# Patient Record
Sex: MALE | Race: WHITE | Employment: FULL TIME | URBAN - METROPOLITAN AREA
[De-identification: names, ages, dates, MRNs, and addresses within clinical notes are randomized per-mention and may not be internally consistent; named-entity substitution may affect disease eponyms.]

---

## 2017-01-30 ENCOUNTER — TELEPHONE (OUTPATIENT)
Dept: FAMILY MEDICINE | Facility: CLINIC | Age: 57
End: 2017-01-30

## 2017-01-30 DIAGNOSIS — Z13.6 CARDIOVASCULAR SCREENING; LDL GOAL LESS THAN 160: ICD-10-CM

## 2017-01-30 DIAGNOSIS — Z00.00 ROUTINE GENERAL MEDICAL EXAMINATION AT A HEALTH CARE FACILITY: ICD-10-CM

## 2017-01-30 DIAGNOSIS — Z12.11 COLON CANCER SCREENING: ICD-10-CM

## 2017-01-30 DIAGNOSIS — Z12.5 SCREENING FOR PROSTATE CANCER: ICD-10-CM

## 2017-01-30 DIAGNOSIS — Z11.59 NEED FOR HEPATITIS C SCREENING TEST: ICD-10-CM

## 2017-01-30 DIAGNOSIS — R03.0 ELEVATED BLOOD PRESSURE READING WITHOUT DIAGNOSIS OF HYPERTENSION: Primary | ICD-10-CM

## 2017-01-30 NOTE — TELEPHONE ENCOUNTER
Please put in lab orders for Pt as he his going to have a Physical.   Thank You   Lab appointment is 2/9/17

## 2017-02-07 ENCOUNTER — TELEPHONE (OUTPATIENT)
Dept: FAMILY MEDICINE | Facility: CLINIC | Age: 57
End: 2017-02-07

## 2017-02-07 DIAGNOSIS — H57.10 EYE PAIN, UNSPECIFIED LATERALITY: Primary | ICD-10-CM

## 2017-02-07 RX ORDER — HYDROCODONE BITARTRATE AND ACETAMINOPHEN 5; 325 MG/1; MG/1
1 TABLET ORAL EVERY 6 HOURS PRN
Qty: 60 TABLET | Refills: 0 | Status: SHIPPED | OUTPATIENT
Start: 2017-02-10 | End: 2017-03-13

## 2017-02-07 NOTE — TELEPHONE ENCOUNTER
Norco med refill request, also please enter the lab orders for his lab appt on 02/09/2017 for his physical.   Thank you.

## 2017-02-07 NOTE — TELEPHONE ENCOUNTER
Controlled Substance Refill Request for norco  Problem List Complete:  No     PROVIDER TO CONSIDER COMPLETION OF PROBLEM LIST AND OVERVIEW/CONTROLLED SUBSTANCE AGREEMENT    Last Written Prescription Date:  1/12/17  Last Fill Quantity: 60,   # refills: 0    Last Office Visit with [facility] primary care provider: 9/1/16    Future Office visit:   Next 5 appointments (look out 90 days)     Feb 16, 2017  8:20 AM   [facility]    [address] [facility] [address] [phone]                  Controlled substance agreement on file: No.     Processing:  Patient will  in clinic   checked in past 6 months?  Yes 2/7/17   Jodie Henao RN

## 2017-02-09 DIAGNOSIS — R03.0 ELEVATED BLOOD PRESSURE READING WITHOUT DIAGNOSIS OF HYPERTENSION: ICD-10-CM

## 2017-02-09 DIAGNOSIS — Z13.6 CARDIOVASCULAR SCREENING; LDL GOAL LESS THAN 160: ICD-10-CM

## 2017-02-09 DIAGNOSIS — Z00.00 ROUTINE GENERAL MEDICAL EXAMINATION AT A HEALTH CARE FACILITY: ICD-10-CM

## 2017-02-09 DIAGNOSIS — Z12.5 SCREENING FOR PROSTATE CANCER: ICD-10-CM

## 2017-02-09 DIAGNOSIS — Z11.59 NEED FOR HEPATITIS C SCREENING TEST: ICD-10-CM

## 2017-02-09 LAB
ALBUMIN SERPL-MCNC: 4.1 G/DL (ref 3.4–5)
ALBUMIN UR-MCNC: NEGATIVE MG/DL
ALP SERPL-CCNC: 45 U/L (ref 40–150)
ALT SERPL W P-5'-P-CCNC: 37 U/L (ref 0–70)
ANION GAP SERPL CALCULATED.3IONS-SCNC: 8 MMOL/L (ref 3–14)
APPEARANCE UR: CLEAR
AST SERPL W P-5'-P-CCNC: 32 U/L (ref 0–45)
BILIRUB SERPL-MCNC: 0.5 MG/DL (ref 0.2–1.3)
BILIRUB UR QL STRIP: NEGATIVE
BUN SERPL-MCNC: 18 MG/DL (ref 7–30)
CALCIUM SERPL-MCNC: 8.7 MG/DL (ref 8.5–10.1)
CHLORIDE SERPL-SCNC: 109 MMOL/L (ref 94–109)
CHOLEST SERPL-MCNC: 173 MG/DL
CO2 SERPL-SCNC: 26 MMOL/L (ref 20–32)
COLOR UR AUTO: YELLOW
CREAT SERPL-MCNC: 0.9 MG/DL (ref 0.66–1.25)
ERYTHROCYTE [DISTWIDTH] IN BLOOD BY AUTOMATED COUNT: 12.5 % (ref 10–15)
GFR SERPL CREATININE-BSD FRML MDRD: 87 ML/MIN/1.7M2
GLUCOSE SERPL-MCNC: 101 MG/DL (ref 70–99)
GLUCOSE UR STRIP-MCNC: NEGATIVE MG/DL
HCT VFR BLD AUTO: 43.5 % (ref 40–53)
HCV AB SERPL QL IA: NORMAL
HDLC SERPL-MCNC: 43 MG/DL
HGB BLD-MCNC: 14.8 G/DL (ref 13.3–17.7)
HGB UR QL STRIP: ABNORMAL
KETONES UR STRIP-MCNC: NEGATIVE MG/DL
LDLC SERPL CALC-MCNC: 108 MG/DL
LEUKOCYTE ESTERASE UR QL STRIP: NEGATIVE
MCH RBC QN AUTO: 31.8 PG (ref 26.5–33)
MCHC RBC AUTO-ENTMCNC: 34 G/DL (ref 31.5–36.5)
MCV RBC AUTO: 93 FL (ref 78–100)
NITRATE UR QL: NEGATIVE
NONHDLC SERPL-MCNC: 130 MG/DL
PH UR STRIP: 5 PH (ref 5–7)
PLATELET # BLD AUTO: 278 10E9/L (ref 150–450)
POTASSIUM SERPL-SCNC: 4.2 MMOL/L (ref 3.4–5.3)
PROT SERPL-MCNC: 7.5 G/DL (ref 6.8–8.8)
PSA SERPL-ACNC: 1.99 UG/L (ref 0–4)
RBC # BLD AUTO: 4.66 10E12/L (ref 4.4–5.9)
RBC #/AREA URNS AUTO: NORMAL /HPF (ref 0–2)
SODIUM SERPL-SCNC: 143 MMOL/L (ref 133–144)
SP GR UR STRIP: >1.03 (ref 1–1.03)
TRIGL SERPL-MCNC: 108 MG/DL
URN SPEC COLLECT METH UR: ABNORMAL
UROBILINOGEN UR STRIP-ACNC: 0.2 EU/DL (ref 0.2–1)
WBC # BLD AUTO: 5.8 10E9/L (ref 4–11)
WBC #/AREA URNS AUTO: NORMAL /HPF (ref 0–2)

## 2017-02-09 PROCEDURE — 85027 COMPLETE CBC AUTOMATED: CPT | Performed by: FAMILY MEDICINE

## 2017-02-09 PROCEDURE — G0103 PSA SCREENING: HCPCS | Performed by: FAMILY MEDICINE

## 2017-02-09 PROCEDURE — 36415 COLL VENOUS BLD VENIPUNCTURE: CPT | Performed by: FAMILY MEDICINE

## 2017-02-09 PROCEDURE — 80061 LIPID PANEL: CPT | Performed by: FAMILY MEDICINE

## 2017-02-09 PROCEDURE — 81001 URINALYSIS AUTO W/SCOPE: CPT | Performed by: FAMILY MEDICINE

## 2017-02-09 PROCEDURE — 80053 COMPREHEN METABOLIC PANEL: CPT | Performed by: FAMILY MEDICINE

## 2017-02-09 PROCEDURE — 86803 HEPATITIS C AB TEST: CPT | Performed by: FAMILY MEDICINE

## 2017-02-09 NOTE — PROGRESS NOTES
SUBJECTIVE:     CC: Jayy Yo is an 56 year old male who presents for preventative health visit.     Healthy Habits:    Do you get at least three servings of calcium containing foods daily (dairy, green leafy vegetables, etc.)? yes    Amount of exercise or daily activities, outside of work: 7 day(s) per week    Problems taking medications regularly No    Medication side effects: No    Have you had an eye exam in the past two years? yes    Do you see a dentist twice per year? yes  Do you have sleep apnea, excessive snoring or daytime drowsiness?snore    Borderline blood pressure. Limited sodium. Exercise. No chest pain or shortness of breath.   Mom alive. Dad with cva - 79yo.   Siblings - some blood pressure.    PROBLEMS TO ADD ON...    Today's PHQ-2 Score:   PHQ-2 ( 1999 Pfizer) 1/13/2015 6/3/2014   Q1: Little interest or pleasure in doing things 0 0   Q2: Feeling down, depressed or hopeless 0 0   PHQ-2 Score 0 0       Abuse: Current or Past(Physical, Sexual or Emotional)- No  Do you feel safe in your environment - Yes    Social History   Substance Use Topics     Smoking status: Never Smoker      Smokeless tobacco: Never Used      Comment: Lives in smoke free household     Alcohol Use: 0.0 oz/week     0 Standard drinks or equivalent per week      Comment: social     beer or mixed 7 per week    Last PSA:   PSA   Date Value Ref Range Status   02/09/2017 1.99 0 - 4 ug/L Final     Comment:     Assay Method:  Chemiluminescence using Siemens Vista analyzer       Recent Labs   Lab Test  02/09/17   0803  01/18/16   0743  01/13/15   0842  12/02/13   0803   CHOL  173  186  178  196   HDL  43  41  45  41   LDL  108*  111*  106  129   TRIG  108  172*  136  129   CHOLHDLRATIO   --    --   4.0  4.8   NHDL  130*  145*   --    --        Reviewed orders with patient. Reviewed health maintenance and updated orders accordingly - Yes    All Histories reviewed and updated in Epic.      ROS:  C: NEGATIVE for fever, chills, change  in weight  I: NEGATIVE for worrisome rashes, moles or lesions  E: NEGATIVE for vision changes or irritation- ongoing bilateral eye issues.   ENT: NEGATIVE for ear, mouth and throat problems. Some snoring.   R: NEGATIVE for significant cough or SOB  CV: NEGATIVE for chest pain, palpitations or peripheral edema. Good exercise tolerance.   GI: NEGATIVE for nausea, abdominal pain, heartburn, or change in bowel habits   male: negative for dysuria, hematuria, decreased urinary stream, erectile dysfunction, urethral discharge  M: NEGATIVE for significant arthralgias or myalgia  N: NEGATIVE for weakness, dizziness or paresthesias  E: NEGATIVE for temperature intolerance, skin/hair changes  H: NEGATIVE for bleeding problems  P: NEGATIVE for changes in mood or affect    Problem list, Medication list, Allergies, and Medical/Social/Surgical histories reviewed in [facility] and updated as appropriate.  OBJECTIVE:     There were no vitals taken for this visit.   BP (!) 140/98  Pulse 59  Temp 97  F (36.1  C) (Oral)  Wt 238 lb (108 kg)  SpO2 95%  BMI 30.56 kg/m2   EXAM:  GENERAL: healthy, alert and no distress  HENT: ear canals and TM's normal, nose and mouth without ulcers or lesions  NECK: no adenopathy, no asymmetry, masses, or scars and thyroid normal to palpation  RESP: lungs clear to auscultation - no rales, rhonchi or wheezes  BREAST: normal without masses, tenderness or nipple discharge and no palpable axillary masses or adenopathy  CV: regular rate and rhythm, normal S1 S2, no S3 or S4, no murmur, click or rub, no peripheral edema and peripheral pulses strong  ABDOMEN: soft, nontender, no hepatosplenomegaly, no masses and bowel sounds normal   (male): patient deferred /rectal exams.   MS: no gross musculoskeletal defects noted, no edema  MS: tight lower lumbar muscles.   SKIN: no suspicious lesions or rashes  NEURO: Normal strength and tone, mentation intact and speech normal  BACK: no CVA tenderness, no paralumbar  "tenderness  PSYCH: mentation appears normal, affect normal/bright  LYMPH: no cervical, supraclavicular, axillary, or inguinal adenopathy    ASSESSMENT/PLAN:     ASSESSMENT / PLAN:  (Z00.00) Routine general medical examination at a health care facility  (primary encounter diagnosis)  Comment: generally healthy and normal exam. Patient deferred /rectal exam  Plan: Reviewed self mole/testicle check handout.  Exercise    (Z12.11) Colon cancer screening  Comment: overdue  Plan: at least mail in fit card - I prefer colonoscopy    (R03.0) Elevated blood-pressure reading without diagnosis of hypertension  Comment: patient will work on diet/exercise and sodium  Plan: self-monitor. Recheck in 6 months  Sooner if worse. norvasc if worse. Chest pain or shortness of breath to er. Lipids ok/non-smoker and no family history early cad.     (M62.995) Muscle spasm  Comment: needs help  Plan: tiZANidine (ZANAFLEX) 4 MG tablet        Continue pain meds per contract. Add zanaflex. Reveiwed risks and side effects of medication  Flexeril too sedating in past. Consider pain specialsit help. Expected course and warning signs reviewed. Call/email with questions/concerns        COUNSELING:  Reviewed preventive health counseling, as reflected in patient instructions       Consider AAA screening for ages 65-75 and smoking history       Regular exercise       Healthy diet/nutrition       Vision screening       Hearing screening       Colon cancer screening       Prostate cancer screening       Osteoporosis Prevention/Bone Health         reports that he has never smoked. He has never used smokeless tobacco.    Estimated body mass index is 30.80 kg/(m^2) as calculated from the following:    Height as of 1/25/16: 6' 2\" (1.88 m).    Weight as of 9/1/16: 240 lb (108.863 kg).       Counseling Resources:  ATP IV Guidelines  Pooled Cohorts Equation Calculator  FRAX Risk Assessment  ICSI Preventive Guidelines  Dietary Guidelines for Americans, " 2010  USDA's MyPlate  ASA Prophylaxis  Lung CA Screening    Vinay Moralez MD  [facility]

## 2017-02-16 ENCOUNTER — OFFICE VISIT (OUTPATIENT)
Dept: FAMILY MEDICINE | Facility: CLINIC | Age: 57
End: 2017-02-16
Payer: COMMERCIAL

## 2017-02-16 VITALS
BODY MASS INDEX: 30.56 KG/M2 | SYSTOLIC BLOOD PRESSURE: 140 MMHG | HEART RATE: 59 BPM | WEIGHT: 238 LBS | DIASTOLIC BLOOD PRESSURE: 98 MMHG | OXYGEN SATURATION: 95 % | TEMPERATURE: 97 F

## 2017-02-16 DIAGNOSIS — M62.838 MUSCLE SPASM: ICD-10-CM

## 2017-02-16 DIAGNOSIS — Z00.00 ROUTINE GENERAL MEDICAL EXAMINATION AT A HEALTH CARE FACILITY: Primary | ICD-10-CM

## 2017-02-16 DIAGNOSIS — R03.0 ELEVATED BLOOD-PRESSURE READING WITHOUT DIAGNOSIS OF HYPERTENSION: ICD-10-CM

## 2017-02-16 DIAGNOSIS — Z12.11 COLON CANCER SCREENING: ICD-10-CM

## 2017-02-16 PROCEDURE — 99396 PREV VISIT EST AGE 40-64: CPT | Performed by: FAMILY MEDICINE

## 2017-02-16 NOTE — MR AVS SNAPSHOT
After Visit Summary   2/16/2017    Jayy Yo    MRN: [id number]           Patient Information     Date Of Birth          1960        Visit Information        Provider Department      2/16/2017 8:20 AM Vinay Moralez MD [facility]        Today's Diagnoses     Routine general medical examination at a health care facility    -  1    Colon cancer screening        Elevated blood-pressure reading without diagnosis of hypertension        Muscle spasm          Care Instructions      Preventive Health Recommendations  Male Ages 50 - 64    Yearly exam:             See your health care provider every year in order to  o   Review health changes.   o   Discuss preventive care.    o   Review your medicines if your doctor has prescribed any.     Have a cholesterol test every 5 years, or more frequently if you are at risk for high cholesterol/heart disease.     Have a diabetes test (fasting glucose) every three years. If you are at risk for diabetes, you should have this test more often.     Have a colonoscopy at age 50, or have a yearly FIT test (stool test). These exams will check for colon cancer.      Talk with your health care provider about whether or not a prostate cancer screening test (PSA) is right for you.    You should be tested each year for STDs (sexually transmitted diseases), if you re at risk.     Shots: Get a flu shot each year. Get a tetanus shot every 10 years.     Nutrition:    Eat at least 5 servings of fruits and vegetables daily.     Eat whole-grain bread, whole-wheat pasta and brown rice instead of white grains and rice.     Talk to your provider about Calcium and Vitamin D.     Lifestyle    Exercise for at least 150 minutes a week (30 minutes a day, 5 days a week). This will help you control your weight and prevent disease.     Limit alcohol to one drink per day.     No smoking.     Wear sunscreen to prevent skin cancer.     See your dentist every six months for an  "exam and cleaning.     See your eye doctor every 1 to 2 years.            Follow-ups after your visit        Who to contact     If you have questions or need follow up information about today's clinic visit or your schedule please contact [facility] directly at [phone].  Normal or non-critical lab and imaging results will be communicated to you by MyChart, letter or phone within 4 business days after the clinic has received the results. If you do not hear from us within 7 days, please contact the clinic through MyChart or phone. If you have a critical or abnormal lab result, we will notify you by phone as soon as possible.  Submit refill requests through [employer] or call your pharmacy and they will forward the refill request to us. Please allow 3 business days for your refill to be completed.          Additional Information About Your Visit        MyChart Information     [employer] lets you send messages to your doctor, view your test results, renew your prescriptions, schedule appointments and more. To sign up, go to [email or web address] . Click on \"Log in\" on the left side of the screen, which will take you to the Welcome page. Then click on \"Sign up Now\" on the right side of the page.     You will be asked to enter the access code listed below, as well as some personal information. Please follow the directions to create your username and password.     Your access code is: [email or web address]  Expires: 2017  1:37 PM     Your access code will  in 90 days. If you need help or a new code, please call your [facility] or [phone].        Care EveryWhere ID     This is your Care EveryWhere ID. This could be used by other organizations to access your [place] medical records  [id number]        Your Vitals Were     Pulse Temperature Pulse Oximetry BMI (Body Mass Index)          59 97  F (36.1  C) (Oral) 95% 30.56 kg/m2         Blood Pressure from Last 3 Encounters:   17 (!) 140/98 "   09/01/16 130/80   01/25/16 120/80    Weight from Last 3 Encounters:   02/16/17 238 lb (108 kg)   09/01/16 240 lb (108.9 kg)   01/25/16 238 lb (108 kg)              Today, you had the following     No orders found for display         Today's Medication Changes          These changes are accurate as of: 2/16/17  1:37 PM.  If you have any questions, ask your nurse or doctor.               Start taking these medicines.        Dose/Directions    tiZANidine 4 MG tablet   Commonly known as:  ZANAFLEX   Used for:  Muscle spasm   Started by:  Vinay Moralez MD        Dose:  4 mg   Take 1 tablet (4 mg) by mouth 3 times daily as needed for muscle spasms (can take 1/2 pill if sedating.)   Quantity:  50 tablet   Refills:  1            Where to get your medicines      These medications were sent to [facility] [address]  [address] [address], [facility] [address]     Phone:  [phone]     tiZANidine 4 MG tablet                Primary Care Provider Office Phone # Fax #    Vinay Moralez [phone]       [facility] [address] [facility] [address]        Thank you!     Thank you for choosing [facility]  for your care. Our goal is always to provide you with excellent care. Hearing back from our patients is one way we can continue to improve our services. Please take a few minutes to complete the written survey that you may receive in the mail after your visit with us. Thank you!             Your Updated Medication List - Protect others around you: Learn how to safely use, store and throw away your medicines at www.disposemymeds.org.          This list is accurate as of: 2/16/17  1:37 PM.  Always use your most recent med list.                   Brand Name Dispense Instructions for use    HYDROcodone-acetaminophen 5-325 MG per tablet    NORCO    60 tablet    Take 1 tablet by mouth every 6 hours as needed for moderate to  severe pain (max#60/month)       ibuprofen 200 MG tablet   Generic drug:  ibuprofen      Take 600 mg by mouth every 4 hours as needed for mild pain       tiZANidine 4 MG tablet    ZANAFLEX    50 tablet    Take 1 tablet (4 mg) by mouth 3 times daily as needed for muscle spasms (can take 1/2 pill if sedating.)

## 2017-02-16 NOTE — NURSING NOTE
"Chief Complaint   Patient presents with     Physical       Initial BP (!) 140/98  Pulse 59  Temp 97  F (36.1  C) (Oral)  Wt 238 lb (108 kg)  SpO2 95%  BMI 30.56 kg/m2 Estimated body mass index is 30.56 kg/(m^2) as calculated from the following:    Height as of 1/25/16: 6' 2\" (1.88 m).    Weight as of this encounter: 238 lb (108 kg).  Medication Reconciliation: complete   Jodie Olson CMA    "

## 2017-02-28 DIAGNOSIS — Z12.11 COLON CANCER SCREENING: ICD-10-CM

## 2017-02-28 LAB — HEMOCCULT STL QL IA: NEGATIVE

## 2017-02-28 PROCEDURE — 82274 ASSAY TEST FOR BLOOD FECAL: CPT | Performed by: FAMILY MEDICINE

## 2017-03-09 ENCOUNTER — OFFICE VISIT (OUTPATIENT)
Dept: OPTOMETRY | Facility: CLINIC | Age: 57
End: 2017-03-09
Payer: COMMERCIAL

## 2017-03-09 DIAGNOSIS — Z96.1 PSEUDOPHAKIA: ICD-10-CM

## 2017-03-09 DIAGNOSIS — H26.492 POSTERIOR CAPSULAR OPACIFICATION VISUALLY SIGNIFICANT OF LEFT EYE: Primary | ICD-10-CM

## 2017-03-09 DIAGNOSIS — H52.12 MYOPIA OF LEFT EYE: ICD-10-CM

## 2017-03-09 DIAGNOSIS — H52.4 PRESBYOPIA: ICD-10-CM

## 2017-03-09 DIAGNOSIS — Z86.69 HX OF RETINAL DETACHMENT: ICD-10-CM

## 2017-03-09 DIAGNOSIS — H52.201 ASTIGMATISM OF RIGHT EYE: ICD-10-CM

## 2017-03-09 PROCEDURE — 92015 DETERMINE REFRACTIVE STATE: CPT | Performed by: OPTOMETRIST

## 2017-03-09 PROCEDURE — 92014 COMPRE OPH EXAM EST PT 1/>: CPT | Performed by: OPTOMETRIST

## 2017-03-09 ASSESSMENT — REFRACTION_MANIFEST
METHOD_AUTOREFRACTION: 1
OD_AXIS: 079
OS_ADD: +2.50
OD_ADD: +2.50
OS_AXIS: 175
OD_CYLINDER: +1.25
OD_CYLINDER: +1.75
OD_SPHERE: -1.25
OD_AXIS: 080
OS_CYLINDER: +0.25
OS_SPHERE: -1.00
OS_SPHERE: -1.00
OS_CYLINDER: SPHERE
OD_SPHERE: PLANO

## 2017-03-09 ASSESSMENT — VISUAL ACUITY
METHOD: SNELLEN - LINEAR
OD_SC+: -1
CORRECTION_TYPE: GLASSES
OD_CC: 20/25-1
OS_SC: 20/70+1
OS_CC: 20/30-1
OD_SC: 20/50
OD_SC: 20/25
OS_SC: 20/50
OS_PH_SC: 20/40

## 2017-03-09 ASSESSMENT — REFRACTION_WEARINGRX
SPECS_TYPE: OTC'S
OD_CYLINDER: SPHERE
OS_SPHERE: -0.25
SPECS_TYPE: SVL DISTANCE
OD_AXIS: 075
OD_SPHERE: PLANO
OS_CYLINDER: SPHERE
OS_CYLINDER: SPHERE
OD_CYLINDER: +1.25
OD_SPHERE: +1.50
OS_SPHERE: +1.50

## 2017-03-09 ASSESSMENT — TONOMETRY
OD_IOP_MMHG: 19
IOP_METHOD: APPLANATION
OS_IOP_MMHG: 19

## 2017-03-09 ASSESSMENT — SLIT LAMP EXAM - LIDS
COMMENTS: NORMAL
COMMENTS: NORMAL

## 2017-03-09 ASSESSMENT — PACHYMETRY
OS_CT(UM): 600
OD_CT(UM): 608

## 2017-03-09 ASSESSMENT — CUP TO DISC RATIO
OD_RATIO: 0.2
OS_RATIO: 0.2

## 2017-03-09 ASSESSMENT — KERATOMETRY
OD_K2POWER_DIOPTERS: 40.50
OD_AXISANGLE2_DEGREES: 167
OD_K1POWER_DIOPTERS: 38.25
OS_AXISANGLE2_DEGREES: 147
OS_K1POWER_DIOPTERS: 38.00
OS_K2POWER_DIOPTERS: 37.50

## 2017-03-09 ASSESSMENT — EXTERNAL EXAM - RIGHT EYE: OD_EXAM: NORMAL

## 2017-03-09 ASSESSMENT — EXTERNAL EXAM - LEFT EYE: OS_EXAM: NORMAL

## 2017-03-09 ASSESSMENT — CONF VISUAL FIELD: OD_NORMAL: 1

## 2017-03-09 NOTE — PATIENT INSTRUCTIONS
Patient was advised of today's exam findings.  Consider prescription reading glasses   Refer for YAG capsulotomy     See Porfirio Verduzco MD  [facility]  [address] [place] [address]  ([facility] 81) [phone]      Return in 1 year for eye exam    Yazmin Bolton O.D.  [address] [address] [address] [place], [place] [id number] [phone]

## 2017-03-09 NOTE — PROGRESS NOTES
Chief Complaint   Patient presents with     COMPREHENSIVE EYE EXAM         Last Eye Exam: 10/28/2014  Dilated Previously: Yes    What are you currently using to see?  Readers, Uses them to read print and to use the computer and on cell phone     Distance Vision Acuity: Noticed gradual change in both eyes, but his injured eye might be just a bit worse. Not as sharp as it used to be. Lighting is key, it's obviously better when the lighting is good.    Near Vision Acuity: Satisfied with vision while reading and using computer with +2.00 readers.  good lighting helps    Eye Comfort: Feels like he's in a fishbowl. His eyes seem to build up a mucus   Do you use eye drops? : No  Occupation or Hobbies: [employer]           Medical, surgical and family histories reviewed and updated 3/9/2017.     Frustrated that his change in vision makes him slower at work.  Has notice more trouble seeing to left side with left eye since retinal detachment     OBJECTIVE: See Ophthalmology exam    ASSESSMENT:    ICD-10-CM    1. 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 Pseudophakia bilateral Z96.1 EYE EXAM (SIMPLE-NONBILLABLE)     REFRACTIVE STATUS     OPHTHALMOLOGY ADULT REFERRAL   3. Astigmatism of right eye H52.201 EYE EXAM (SIMPLE-NONBILLABLE)     REFRACTIVE STATUS   4. Myopia of left eye H52.12 EYE EXAM (SIMPLE-NONBILLABLE)     REFRACTIVE STATUS   5. Presbyopia H52.4 EYE EXAM (SIMPLE-NONBILLABLE)     REFRACTIVE STATUS   6. Hx of retinal detachment right 2014, left 2016 Z86.69 EYE EXAM (SIMPLE-NONBILLABLE)     REFRACTIVE STATUS      PLAN:     Patient Instructions   Patient was advised of today's exam findings.  Consider prescription reading glasses   Refer for YAG capsulotomy     See Porfirio Verduzco MD  [facility]  [address] [place]  [address]  ([facility] 81) [phone]      Return in 1 year for eye exam    Yazmin Bolton O.D.  [address] [place], [place] [id number] [phone]

## 2017-03-09 NOTE — MR AVS SNAPSHOT
"              After Visit Summary   3/9/2017    Jayy Yo    MRN: [id number]           Patient Information     Date Of Birth          1960        Visit Information        Provider Department      3/9/2017 9:00 AM Yazmin Bolton OD [facility]        Today's Diagnoses     Posterior capsular opacification visually significant of left eye    -  1      Care Instructions    Patient was advised of today's exam findings.  Consider prescription reading glasses   Refer for YAG capsulotomy     See Porfirio Verduzco MD  [facility]  [address] [place] [address]  ([place] [address])  [phone]      Return in 1 year for eye exam    Yazmin Bolton O.D.  [facility]   [address] [place], [place] [id number] [phone]          Follow-ups after your visit        Who to contact     If you have questions or need follow up information about today's clinic visit or your schedule please contact [facility] directly at [phone].  Normal or non-critical lab and imaging results will be communicated to you by MyChart, letter or phone within 4 business days after the clinic has received the results. If you do not hear from us within 7 days, please contact the clinic through MyChart or phone. If you have a critical or abnormal lab result, we will notify you by phone as soon as possible.  Submit refill requests through [employer] or call your pharmacy and they will forward the refill request to us. Please allow 3 business days for your refill to be completed.          Additional Information About Your Visit        MyChart Information     [employer] lets you send messages to your doctor, view your test results, renew your prescriptions, schedule appointments and more. To sign up, go to [email or web address] . Click on \"Log in\" on the left side of the screen, which will take you to the Welcome page. Then click on \"Sign up Now\" on the " right side of the page.     You will be asked to enter the access code listed below, as well as some personal information. Please follow the directions to create your username and password.     Your access code is: [email or web address]  Expires: 2017  1:37 PM     Your access code will  in 90 days. If you need help or a new code, please call your [place] clinic or [phone].        Care EveryWhere ID     This is your Care EveryWhere ID. This could be used by other organizations to access your [place] medical records  [id number]         Blood Pressure from Last 3 Encounters:   17 (!) 140/98   16 130/80   16 120/80    Weight from Last 3 Encounters:   17 108 kg (238 lb)   16 108.9 kg (240 lb)   16 108 kg (238 lb)              Today, you had the following     No orders found for display       Primary Care Provider Office Phone # Fax #    Vinay Kendall Moralez [phone]       [facility] [address] [facility] [address]        Thank you!     Thank you for choosing [facility]  for your care. Our goal is always to provide you with excellent care. Hearing back from our patients is one way we can continue to improve our services. Please take a few minutes to complete the written survey that you may receive in the mail after your visit with us. Thank you!             Your Updated Medication List - Protect others around you: Learn how to safely use, store and throw away your medicines at www.disposemymeds.org.          This list is accurate as of: 3/9/17 10:31 AM.  Always use your most recent med list.                   Brand Name Dispense Instructions for use    HYDROcodone-acetaminophen 5-325 MG per tablet    NORCO    60 tablet    Take 1 tablet by mouth every 6 hours as needed for moderate to severe pain (max#60/month)       ibuprofen 200 MG tablet   Generic drug:  ibuprofen      Take 600 mg by mouth every 4 hours as needed for mild  pain       tiZANidine 4 MG tablet    ZANAFLEX    50 tablet    Take 1 tablet (4 mg) by mouth 3 times daily as needed for muscle spasms (can take 1/2 pill if sedating.)

## 2017-03-13 DIAGNOSIS — H57.10 EYE PAIN, UNSPECIFIED LATERALITY: ICD-10-CM

## 2017-03-13 RX ORDER — HYDROCODONE BITARTRATE AND ACETAMINOPHEN 5; 325 MG/1; MG/1
1 TABLET ORAL EVERY 6 HOURS PRN
Qty: 60 TABLET | Refills: 0 | Status: SHIPPED | OUTPATIENT
Start: 2017-03-13 | End: 2017-04-13

## 2017-03-13 NOTE — TELEPHONE ENCOUNTER
At [facility] last physical, discussed his shoulder pain. Talked about a muscle relaxer, he did not realize at the time that the pain med that he currently takes is different. He declined the muscle relaxer. He did not pick it up at the Pharmacy. He says he does not do well on muscle relaxers. He would like to continue his current regime with the pain medication. He is requesting a refill of Norco please.  Thank You

## 2017-03-13 NOTE — TELEPHONE ENCOUNTER
Controlled Substance Refill Request for Norco  Problem List Complete:  No     PROVIDER TO CONSIDER COMPLETION OF PROBLEM LIST AND OVERVIEW/CONTROLLED SUBSTANCE AGREEMENT    Last Written Prescription Date:  2/10/17  Last Fill Quantity: 60,   # refills: 0    Last Office Visit with [facility] primary care provider: 2/16/17    Controlled substance agreement on file: No.        checked in past 6 months?  Yes 3/13/17     Stefanie WILLIAMN, RN, CPN

## 2017-04-12 ENCOUNTER — TELEPHONE (OUTPATIENT)
Dept: FAMILY MEDICINE | Facility: CLINIC | Age: 57
End: 2017-04-12

## 2017-04-12 DIAGNOSIS — H57.10 EYE PAIN, UNSPECIFIED LATERALITY: ICD-10-CM

## 2017-04-12 NOTE — TELEPHONE ENCOUNTER
Controlled Substance Refill Request for Norco  Problem List Complete: No      PROVIDER TO CONSIDER COMPLETION OF PROBLEM LIST AND OVERVIEW/CONTROLLED SUBSTANCE AGREEMENT     Last Written Prescription Date: 3/13/17  Last Fill Quantity: 60, # refills: 0     Last Office Visit with [facility] primary care provider: 2/16/17     Controlled substance agreement on file: No.          checked in past 6 months? Yes 3/13/17      Jodie Henao RN

## 2017-04-13 RX ORDER — HYDROCODONE BITARTRATE AND ACETAMINOPHEN 5; 325 MG/1; MG/1
1 TABLET ORAL EVERY 6 HOURS PRN
Qty: 60 TABLET | Refills: 0 | Status: SHIPPED | OUTPATIENT
Start: 2017-05-12 | End: 2017-06-08

## 2017-04-13 RX ORDER — HYDROCODONE BITARTRATE AND ACETAMINOPHEN 5; 325 MG/1; MG/1
1 TABLET ORAL EVERY 6 HOURS PRN
Qty: 60 TABLET | Refills: 0 | Status: SHIPPED | OUTPATIENT
Start: 2017-04-13 | End: 2017-04-13

## 2017-06-07 ENCOUNTER — TELEPHONE (OUTPATIENT)
Dept: FAMILY MEDICINE | Facility: CLINIC | Age: 57
End: 2017-06-07

## 2017-06-07 DIAGNOSIS — H57.10 EYE PAIN, UNSPECIFIED LATERALITY: ICD-10-CM

## 2017-06-07 NOTE — TELEPHONE ENCOUNTER
Patient is requesting a refill of his pain medication Norco please. Please let abdi know its ready. Thank you!

## 2017-06-07 NOTE — TELEPHONE ENCOUNTER
Last refill 5/12/17  Last office visit 2/16/17     Controlled Substance Refill Request for Norco  Problem List Complete: No       PROVIDER TO CONSIDER COMPLETION OF PROBLEM LIST AND OVERVIEW/CONTROLLED SUBSTANCE AGREEMENT      Last Written Prescription Date: 3/13/17  Last Fill Quantity: 60, # refills: 0      Last Office Visit with [facility] primary care provider: 2/16/17      Controlled substance agreement on file: No.           checked in past 6 months? Yes 3/13/17

## 2017-06-08 RX ORDER — HYDROCODONE BITARTRATE AND ACETAMINOPHEN 5; 325 MG/1; MG/1
1 TABLET ORAL EVERY 6 HOURS PRN
Qty: 60 TABLET | Refills: 0 | Status: SHIPPED | OUTPATIENT
Start: 2017-06-10 | End: 2017-07-17

## 2017-06-08 NOTE — TELEPHONE ENCOUNTER
Brought RX to Starr, his wife, Informed her that an appointment is needed in the next month.Jolene Strong MA/TC

## 2017-06-08 NOTE — TELEPHONE ENCOUNTER
Please call. Patient not due until Saturday but can pick-up. I'd also like a follow-up md appointment in next month to discuss medication/pain/etc.

## 2017-06-26 ENCOUNTER — OFFICE VISIT (OUTPATIENT)
Dept: FAMILY MEDICINE | Facility: CLINIC | Age: 57
End: 2017-06-26
Payer: COMMERCIAL

## 2017-06-26 VITALS
DIASTOLIC BLOOD PRESSURE: 74 MMHG | OXYGEN SATURATION: 95 % | BODY MASS INDEX: 31.7 KG/M2 | TEMPERATURE: 97.4 F | HEART RATE: 54 BPM | SYSTOLIC BLOOD PRESSURE: 110 MMHG | WEIGHT: 247 LBS | HEIGHT: 74 IN

## 2017-06-26 DIAGNOSIS — R03.0 ELEVATED BLOOD-PRESSURE READING WITHOUT DIAGNOSIS OF HYPERTENSION: Primary | ICD-10-CM

## 2017-06-26 DIAGNOSIS — H57.10 EYE PAIN, UNSPECIFIED LATERALITY: ICD-10-CM

## 2017-06-26 PROCEDURE — 99214 OFFICE O/P EST MOD 30 MIN: CPT | Performed by: FAMILY MEDICINE

## 2017-06-26 NOTE — MR AVS SNAPSHOT
"              After Visit Summary   2017    Jayy Yo    MRN: [id number]           Patient Information     Date Of Birth          1960        Visit Information        Provider Department      2017 7:15 AM Vinay Moralez MD [facility]        Today's Diagnoses     Elevated blood-pressure reading without diagnosis of hypertension    -  1    Eye pain, unspecified laterality           Follow-ups after your visit        Who to contact     If you have questions or need follow up information about today's clinic visit or your schedule please contact [facility] directly at [phone].  Normal or non-critical lab and imaging results will be communicated to you by AYLIENhart, letter or phone within 4 business days after the clinic has received the results. If you do not hear from us within 7 days, please contact the clinic through [employer] or phone. If you have a critical or abnormal lab result, we will notify you by phone as soon as possible.  Submit refill requests through [employer] or call your pharmacy and they will forward the refill request to us. Please allow 3 business days for your refill to be completed.          Additional Information About Your Visit        MyChart Information     [employer] lets you send messages to your doctor, view your test results, renew your prescriptions, schedule appointments and more. To sign up, go to [email or web address] . Click on \"Log in\" on the left side of the screen, which will take you to the Welcome page. Then click on \"Sign up Now\" on the right side of the page.     You will be asked to enter the access code listed below, as well as some personal information. Please follow the directions to create your username and password.     Your access code is: [email or web address]  Expires: 2017  7:40 AM     Your access code will  in 90 days. If you need help or a new code, please call your [facility] or [phone].        Care " "EveryWhere ID     This is your Care EveryWhere ID. This could be used by other organizations to access your [place] medical records  [id number]        Your Vitals Were     Pulse Temperature Height Pulse Oximetry BMI (Body Mass Index)       54 97.4  F (36.3  C) (Oral) 6' 2\" (1.88 m) 95% 31.71 kg/m2        Blood Pressure from Last 3 Encounters:   06/26/17 110/74   02/16/17 (!) 140/98   09/01/16 130/80    Weight from Last 3 Encounters:   06/26/17 247 lb (112 kg)   02/16/17 238 lb (108 kg)   09/01/16 240 lb (108.9 kg)              Today, you had the following     No orders found for display       Primary Care Provider Office Phone # Fax #    Vinay Moralez [phone]       [facility] [address] [facility] [address]        Equal Access to Services     KAYKAY DIXON : Hadii laura ku hadasho Soomaali, waaxda luqadaha, qaybta kaalmada adeegyada, chay scottin hayrimma brower . So [facility] [phone].    ATENCIÓN: Si habla español, tiene a foster disposición servicios gratuitos de asistencia lingüística. Llame al [phone].    We comply with applicable federal civil rights laws and [place] laws. We do not discriminate on the basis of race, color, national origin, age, disability sex, sexual orientation or gender identity.            Thank you!     Thank you for choosing [facility]  for your care. Our goal is always to provide you with excellent care. Hearing back from our patients is one way we can continue to improve our services. Please take a few minutes to complete the written survey that you may receive in the mail after your visit with us. Thank you!             Your Updated Medication List - Protect others around you: Learn how to safely use, store and throw away your medicines at www.disposemymeds.org.          This list is accurate as of: 6/26/17  7:40 AM.  Always use your most recent med list.                   Brand Name Dispense Instructions for use " Diagnosis    HYDROcodone-acetaminophen 5-325 MG per tablet    NORCO    60 tablet    Take 1 tablet by mouth every 6 hours as needed for moderate to severe pain (max#60/month)    Eye pain, unspecified laterality       ibuprofen 200 MG tablet   Generic drug:  ibuprofen      Take 600 mg by mouth every 4 hours as needed for mild pain        tiZANidine 4 MG tablet    ZANAFLEX    50 tablet    Take 1 tablet (4 mg) by mouth 3 times daily as needed for muscle spasms (can take 1/2 pill if sedating.)    Muscle spasm

## 2017-06-26 NOTE — NURSING NOTE
"Chief Complaint   Patient presents with     Medication Therapy Management       Initial /74  Pulse 54  Temp 97.4  F (36.3  C) (Oral)  Ht 6' 2\" (1.88 m)  Wt 247 lb (112 kg)  SpO2 95%  BMI 31.71 kg/m2 Estimated body mass index is 31.71 kg/(m^2) as calculated from the following:    Height as of this encounter: 6' 2\" (1.88 m).    Weight as of this encounter: 247 lb (112 kg).  Medication Reconciliation: complete   Jodie Olson CMA    "
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
## 2017-07-17 ENCOUNTER — TELEPHONE (OUTPATIENT)
Dept: FAMILY MEDICINE | Facility: CLINIC | Age: 57
End: 2017-07-17

## 2017-07-17 DIAGNOSIS — H57.10 EYE PAIN, UNSPECIFIED LATERALITY: ICD-10-CM

## 2017-07-17 RX ORDER — HYDROCODONE BITARTRATE AND ACETAMINOPHEN 5; 325 MG/1; MG/1
1 TABLET ORAL EVERY 6 HOURS PRN
Qty: 60 TABLET | Refills: 0 | Status: SHIPPED | OUTPATIENT
Start: 2017-07-17 | End: 2017-08-15

## 2017-07-17 NOTE — TELEPHONE ENCOUNTER
Controlled Substance Refill Request for Norco  Problem List Complete:  No     PROVIDER TO CONSIDER COMPLETION OF PROBLEM LIST AND OVERVIEW/CONTROLLED SUBSTANCE AGREEMENT    Last Written Prescription Date:  6/10/17  Last Fill Quantity: 60,   # refills: 0    Last Office Visit with [facility] primary care provider: 6/26/17    Future Office visit: none    Controlled substance agreement on file: No.        checked in past 6 months?  Yes 7/17/17, last refill 6/11/17     Stefanie WILLIAMN, RN, CPN

## 2017-08-15 ENCOUNTER — TELEPHONE (OUTPATIENT)
Dept: FAMILY MEDICINE | Facility: CLINIC | Age: 57
End: 2017-08-15

## 2017-08-15 DIAGNOSIS — H57.10 EYE PAIN, UNSPECIFIED LATERALITY: ICD-10-CM

## 2017-08-15 RX ORDER — HYDROCODONE BITARTRATE AND ACETAMINOPHEN 5; 325 MG/1; MG/1
1 TABLET ORAL EVERY 6 HOURS PRN
Qty: 60 TABLET | Refills: 0 | Status: SHIPPED | OUTPATIENT
Start: 2017-08-15 | End: 2017-09-11

## 2017-08-15 NOTE — TELEPHONE ENCOUNTER
Patient needs a refill on his pain medication, Oscar. [facility]

## 2017-08-15 NOTE — TELEPHONE ENCOUNTER
Controlled Substance Refill Request for Norco  Problem List Complete:  No      PROVIDER TO CONSIDER COMPLETION OF PROBLEM LIST AND OVERVIEW/CONTROLLED SUBSTANCE AGREEMENT     Last Written Prescription Date:  7/17/17  Last Fill Quantity: 60,   # refills: 0     Last Office Visit with [facility] primary care provider: 6/26/17     Future Office visit: none     Controlled substance agreement on file: No.          checked in past 6 months?  Yes 7/17/17    Jodie Henao RN

## 2017-08-22 ENCOUNTER — TRANSFERRED RECORDS (OUTPATIENT)
Dept: HEALTH INFORMATION MANAGEMENT | Facility: CLINIC | Age: 57
End: 2017-08-22

## 2017-09-11 ENCOUNTER — TELEPHONE (OUTPATIENT)
Dept: FAMILY MEDICINE | Facility: CLINIC | Age: 57
End: 2017-09-11

## 2017-09-11 DIAGNOSIS — H57.10 EYE PAIN, UNSPECIFIED LATERALITY: ICD-10-CM

## 2017-09-11 RX ORDER — HYDROCODONE BITARTRATE AND ACETAMINOPHEN 5; 325 MG/1; MG/1
1 TABLET ORAL EVERY 6 HOURS PRN
Qty: 60 TABLET | Refills: 0 | Status: SHIPPED | OUTPATIENT
Start: 2017-09-11 | End: 2017-09-11

## 2017-09-11 RX ORDER — HYDROCODONE BITARTRATE AND ACETAMINOPHEN 5; 325 MG/1; MG/1
1 TABLET ORAL EVERY 6 HOURS PRN
Qty: 60 TABLET | Refills: 0 | Status: SHIPPED | OUTPATIENT
Start: 2017-10-12 | End: 2017-10-12

## 2017-09-11 NOTE — TELEPHONE ENCOUNTER
Controlled Substance Refill Request for Norco  Problem List Complete:  No       PROVIDER TO CONSIDER COMPLETION OF PROBLEM LIST AND OVERVIEW/CONTROLLED SUBSTANCE AGREEMENT      Last Written Prescription Date:  8/15/17, would be an early refill   Last Fill Quantity: 60,   # refills: 0      [facility] Visit with [facility] care provider: 6/26/17      Future Office visit: none      Controlled substance agreement on file: No.           checked in past 6 months?  Yes 7/17/17

## 2017-09-11 NOTE — TELEPHONE ENCOUNTER
[facility]Jolene Strong MA/TC

## 2017-10-10 ENCOUNTER — TELEPHONE (OUTPATIENT)
Dept: FAMILY MEDICINE | Facility: CLINIC | Age: 57
End: 2017-10-10

## 2017-10-10 DIAGNOSIS — H57.10 EYE PAIN, UNSPECIFIED LATERALITY: ICD-10-CM

## 2017-10-10 NOTE — TELEPHONE ENCOUNTER
Controlled Substance Refill Request for Norco  Problem List Complete:  No       PROVIDER TO CONSIDER COMPLETION OF PROBLEM LIST AND OVERVIEW/CONTROLLED SUBSTANCE AGREEMENT      Last Written Prescription Date:  10/12/17  Last Fill Quantity: 60,   # refills: 0      Last Office Visit with [facility] primary care provider: 6/26/17      Future Office visit: none      Controlled substance agreement on file: No.           checked in past 6 months?  Yes 7/17/17  Jodie Henao RN

## 2017-10-11 ENCOUNTER — TELEPHONE (OUTPATIENT)
Dept: FAMILY MEDICINE | Facility: CLINIC | Age: 57
End: 2017-10-11

## 2017-10-12 RX ORDER — HYDROCODONE BITARTRATE AND ACETAMINOPHEN 5; 325 MG/1; MG/1
1 TABLET ORAL EVERY 6 HOURS PRN
Qty: 60 TABLET | Refills: 0 | Status: SHIPPED | OUTPATIENT
Start: 2017-10-12 | End: 2017-11-09

## 2017-10-12 NOTE — TELEPHONE ENCOUNTER
I checked with the [facility] and they had Rx on file form him with a fill date of today. I told them to go ahead an fill this RX. Destroyed new RX dated 10/12/17.Jolene Strong MA/TARYN

## 2017-11-09 ENCOUNTER — TELEPHONE (OUTPATIENT)
Dept: FAMILY MEDICINE | Facility: CLINIC | Age: 57
End: 2017-11-09

## 2017-11-09 DIAGNOSIS — H57.10 EYE PAIN, UNSPECIFIED LATERALITY: ICD-10-CM

## 2017-11-09 RX ORDER — HYDROCODONE BITARTRATE AND ACETAMINOPHEN 5; 325 MG/1; MG/1
1 TABLET ORAL EVERY 6 HOURS PRN
Qty: 60 TABLET | Refills: 0 | Status: SHIPPED | OUTPATIENT
Start: 2017-11-09 | End: 2017-12-08

## 2017-11-09 NOTE — TELEPHONE ENCOUNTER
Controlled Substance Refill Request for Norco  Problem List Complete:  No       PROVIDER TO CONSIDER COMPLETION OF PROBLEM LIST AND OVERVIEW/CONTROLLED SUBSTANCE AGREEMENT      Last Written Prescription Date:  10/12/17  Last Fill Quantity: 60,   # refills: 0      Last Office Visit with [facility] primary care provider: 6/26/17      Future Office visit: none      Controlled substance agreement on file: No.           checked in past 6 months?  Yes 7/17/17  Jodie Henao RN

## 2017-11-09 NOTE — TELEPHONE ENCOUNTER
Reason for Call:  Medication or medication refill:    Do you use a [facility]?  Name of the pharmacy and phone number for the current request:  [facility] [phone]    Name of the medication requested: HYDROcodone-acetaminophen (NORCO) 5-325 MG per tablet    Other request: Please let Starr now when ready.     Can we leave a detailed message on this number? YES    Phone number patient can be reached at: Other phone number:  [phone]     Best Time: anytime    Call taken on 11/9/2017 at 9:37 AM by Sameer Caballero

## 2017-12-08 ENCOUNTER — TELEPHONE (OUTPATIENT)
Dept: FAMILY MEDICINE | Facility: CLINIC | Age: 57
End: 2017-12-08

## 2017-12-08 DIAGNOSIS — H57.10 EYE PAIN, UNSPECIFIED LATERALITY: ICD-10-CM

## 2017-12-08 RX ORDER — HYDROCODONE BITARTRATE AND ACETAMINOPHEN 5; 325 MG/1; MG/1
1 TABLET ORAL EVERY 6 HOURS PRN
Qty: 60 TABLET | Refills: 0 | Status: SHIPPED | OUTPATIENT
Start: 2017-12-08 | End: 2018-01-16

## 2017-12-08 NOTE — TELEPHONE ENCOUNTER
Pt is requesting a refill on his pain medication Norco. He has an appointment to see the Dr on 12/22/17 to discuss possible blood pressure medication and med renewals. Thank You

## 2017-12-08 NOTE — TELEPHONE ENCOUNTER
Controlled Substance Refill Request for Norco  Problem List Complete:  No       PROVIDER TO CONSIDER COMPLETION OF PROBLEM LIST AND OVERVIEW/CONTROLLED SUBSTANCE AGREEMENT      Last Written Prescription Date:  11/09/17  Last Fill Quantity: 60,   # refills: 0      Last Office Visit with [facility] [place] care provider: 6/26/17      Future Office visit: none      Controlled substance agreement on file: No.           checked in past 6 months?  Yes 7/17/17  Jodie Henao RN

## 2017-12-22 ENCOUNTER — OFFICE VISIT (OUTPATIENT)
Dept: FAMILY MEDICINE | Facility: CLINIC | Age: 57
End: 2017-12-22
Payer: COMMERCIAL

## 2017-12-22 VITALS
SYSTOLIC BLOOD PRESSURE: 145 MMHG | OXYGEN SATURATION: 95 % | HEIGHT: 74 IN | HEART RATE: 76 BPM | BODY MASS INDEX: 32.08 KG/M2 | TEMPERATURE: 97.9 F | DIASTOLIC BLOOD PRESSURE: 79 MMHG | WEIGHT: 250 LBS

## 2017-12-22 DIAGNOSIS — I10 HYPERTENSION GOAL BP (BLOOD PRESSURE) < 140/90: Primary | ICD-10-CM

## 2017-12-22 PROCEDURE — 99213 OFFICE O/P EST LOW 20 MIN: CPT | Performed by: FAMILY MEDICINE

## 2017-12-22 RX ORDER — METOPROLOL SUCCINATE 50 MG/1
50 TABLET, EXTENDED RELEASE ORAL DAILY
Qty: 30 TABLET | Refills: 5 | Status: SHIPPED | OUTPATIENT
Start: 2017-12-22 | End: 2018-02-20

## 2017-12-22 NOTE — NURSING NOTE
"Chief Complaint   Patient presents with     Hypertension       Initial /82  Pulse 76  Temp 97.9  F (36.6  C) (Oral)  Ht 6' 2\" (1.88 m)  Wt 250 lb (113.4 kg)  SpO2 95%  BMI 32.1 kg/m2 Estimated body mass index is 32.1 kg/(m^2) as calculated from the following:    Height as of this encounter: 6' 2\" (1.88 m).    Weight as of this encounter: 250 lb (113.4 kg).  Medication Reconciliation: complete   Jodie Olson CMA    "

## 2017-12-22 NOTE — MR AVS SNAPSHOT
"              After Visit Summary   2017    Jayy Yo    MRN: [id number]           Patient Information     Date Of Birth          1960        Visit Information        Provider Department      2017 3:00 PM Vinay Moralez MD [facility]        Today's Diagnoses     Hypertension goal BP (blood pressure) < 140/90    -  1       Follow-ups after your visit        Who to contact     If you have questions or need follow up information about today's clinic visit or your schedule please contact [facility] directly at [phone].  Normal or non-critical lab and imaging results will be communicated to you by Assemblahart, letter or phone within 4 business days after the clinic has received the results. If you do not hear from us within 7 days, please contact the clinic through [employer] or phone. If you have a critical or abnormal lab result, we will notify you by phone as soon as possible.  Submit refill requests through [employer] or call your pharmacy and they will forward the refill request to us. Please allow 3 business days for your refill to be completed.          Additional Information About Your Visit        MyChart Information     [employer] lets you send messages to your doctor, view your test results, renew your prescriptions, schedule appointments and more. To sign up, go to [email or web address] . Click on \"Log in\" on the left side of the screen, which will take you to the Welcome page. Then click on \"Sign up Now\" on the right side of the page.     You will be asked to enter the access code listed below, as well as some personal information. Please follow the directions to create your username and password.     Your access code is: [email or web address]  Expires: 3/22/2018  3:33 PM     Your access code will  in 90 days. If you need help or a new code, please call your [facility] or [phone].        Care EveryWhere ID     This is your Care EveryWhere ID. This could be " "used by other organizations to access your [place] medical records  [id number]        Your Vitals Were     Pulse Temperature Height Pulse Oximetry BMI (Body Mass Index)       76 97.9  F (36.6  C) (Oral) 6' 2\" (1.88 m) 95% 32.1 kg/m2        Blood Pressure from Last 3 Encounters:   12/22/17 145/79   06/26/17 110/74   02/16/17 (!) 140/98    Weight from Last 3 Encounters:   12/22/17 250 lb (113.4 kg)   06/26/17 247 lb (112 kg)   02/16/17 238 lb (108 kg)              Today, you had the following     No orders found for display         Today's Medication Changes          These changes are accurate as of: 12/22/17  3:33 PM.  If you have any questions, ask your nurse or doctor.               Start taking these medicines.        Dose/Directions    metoprolol 50 MG 24 hr tablet   Commonly known as:  TOPROL-XL   Used for:  Hypertension goal BP (blood pressure) < 140/90   Started by:  Vinay Moralez MD        Dose:  50 mg   Take 1 tablet (50 mg) by mouth daily New for blood pressure take at bedtime   Quantity:  30 tablet   Refills:  5            Where to get your medicines      These medications were sent to [facility] [address], [facility] [address]  [address] [place], [place] [address] [id number]     Phone:  [phone]     metoprolol 50 MG 24 hr tablet                Primary Care Provider Office Phone # Fax #    Vinay Moralez [phone]       [address] [place] [address]        Equal Access to Services     [facility] : Hadii laura espositoo Sosandra, waaxda luqadaha, qaybta kaalmachay virk idiin hayaan adeeg kharash la'aan . So [facility] [phone].    ATENCIÓN: Si habla español, tiene a foster disposición servicios gratuitos de asistencia lingüística. Llame al [phone].    We comply with applicable federal civil rights laws and [place] laws. We do not discriminate on the basis of race, color, national origin, age, disability, sex, sexual " orientation, or gender identity.            Thank you!     Thank you for [facility]  [facility]. Our goal is always to provide you with excellent care. Hearing back from our patients is one way we can continue to improve our services. Please take a few minutes to complete the written survey that you may receive in the mail after your visit with us. Thank you!             Your Updated Medication List - Protect others around you: Learn how to safely use, store and throw away your medicines at www.disposemymeds.org.          This list is accurate as of: 12/22/17  3:33 PM.  Always use your most recent med list.                   Brand Name Dispense Instructions for use Diagnosis    HYDROcodone-acetaminophen 5-325 MG per tablet    NORCO    60 tablet    Take 1 tablet by mouth every 6 hours as needed for moderate to severe pain (max#60/month)    Eye pain, unspecified laterality       ibuprofen 200 MG tablet   Generic drug:  ibuprofen      Take 600 mg by mouth every 4 hours as needed for mild pain        metoprolol 50 MG 24 hr tablet    TOPROL-XL    30 tablet    Take 1 tablet (50 mg) by mouth daily New for blood pressure take at bedtime    Hypertension goal BP (blood pressure) < 140/90       tiZANidine 4 MG tablet    ZANAFLEX    50 tablet    Take 1 tablet (4 mg) by mouth 3 times daily as needed for muscle spasms (can take 1/2 pill if sedating.)    Muscle spasm
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
## 2018-01-16 DIAGNOSIS — H57.10 EYE PAIN, UNSPECIFIED LATERALITY: ICD-10-CM

## 2018-01-16 RX ORDER — HYDROCODONE BITARTRATE AND ACETAMINOPHEN 5; 325 MG/1; MG/1
1 TABLET ORAL EVERY 6 HOURS PRN
Qty: 60 TABLET | Refills: 0 | Status: SHIPPED | OUTPATIENT
Start: 2018-01-16 | End: 2018-02-15

## 2018-01-16 NOTE — TELEPHONE ENCOUNTER
Controlled Substance Refill Request for Norco  Problem List Complete:  No     PROVIDER TO CONSIDER COMPLETION OF PROBLEM LIST AND OVERVIEW/CONTROLLED SUBSTANCE AGREEMENT    Last Written Prescription Date:  12/8/17  Last Fill Quantity: 60,   # refills: 0    Last Office Visit with [facility] primary care provider: 12/22/17      Controlled substance agreement on file: No.       checked in past 6 months?  Yes  last filled on 12/11/17     Stefanie WILLIAMN, RN, CPN

## 2018-01-16 NOTE — TELEPHONE ENCOUNTER
RX brought to the [facility] and called and informed patient's wife.Jolene Strong MA/TC

## 2018-01-29 ENCOUNTER — TELEPHONE (OUTPATIENT)
Dept: FAMILY MEDICINE | Facility: CLINIC | Age: 58
End: 2018-01-29

## 2018-01-29 NOTE — TELEPHONE ENCOUNTER
Panel Management Review      Patient has the following on his problem list:     Hypertension   Last three blood pressure readings:  BP Readings from Last 3 Encounters:   12/22/17 145/79   06/26/17 110/74   02/16/17 (!) 140/98     Blood pressure: FAILED    HTN Guidelines:  Age 18-59 BP range:  Less than 140/90  Age 60-85 with Diabetes:  Less than 140/90  Age 60-85 without Diabetes:  less than 150/90      Composite cancer screening  Chart review shows that this patient is due/due soon for the following None  Summary:    Patient is due/failing the following:   BP CHECK    Action needed:   Follow up in March 2018    Type of outreach:    0    Questions for provider review:    None                                                                                                                                    Jodie Olson CMA     Chart routed to o .

## 2018-02-15 ENCOUNTER — TELEPHONE (OUTPATIENT)
Dept: FAMILY MEDICINE | Facility: CLINIC | Age: 58
End: 2018-02-15

## 2018-02-15 DIAGNOSIS — H57.10 EYE PAIN, UNSPECIFIED LATERALITY: ICD-10-CM

## 2018-02-15 RX ORDER — HYDROCODONE BITARTRATE AND ACETAMINOPHEN 5; 325 MG/1; MG/1
1 TABLET ORAL EVERY 6 HOURS PRN
Qty: 60 TABLET | Refills: 0 | Status: SHIPPED | OUTPATIENT
Start: 2018-02-15 | End: 2018-04-19

## 2018-02-15 NOTE — TELEPHONE ENCOUNTER
Controlled Substance Refill Request for Norco  Problem List Complete:  No      PROVIDER TO CONSIDER COMPLETION OF PROBLEM LIST AND OVERVIEW/CONTROLLED SUBSTANCE AGREEMENT     Last Written Prescription Date:  1/16/18  Last Fill Quantity: 60,   # refills: 0     Last Office Visit with [facility] primary care provider: 12/22/17        Controlled substance agreement on file: No.         checked in past 6 months?  Yes  last filled on 12/11/17    Jodie Henao RN

## 2018-02-20 ENCOUNTER — TELEPHONE (OUTPATIENT)
Dept: FAMILY MEDICINE | Facility: CLINIC | Age: 58
End: 2018-02-20

## 2018-02-20 DIAGNOSIS — I10 HYPERTENSION GOAL BP (BLOOD PRESSURE) < 140/90: ICD-10-CM

## 2018-02-20 RX ORDER — METOPROLOL SUCCINATE 50 MG/1
50 TABLET, EXTENDED RELEASE ORAL DAILY
Qty: 90 TABLET | Refills: 0 | Status: SHIPPED | OUTPATIENT
Start: 2018-02-20 | End: 2018-04-19

## 2018-02-20 NOTE — TELEPHONE ENCOUNTER
Jayy needs a refill of his blood pressure medication. Please note 90 days instead of 30 days. Any questions please call Starr at the . Thank you!

## 2018-02-20 NOTE — TELEPHONE ENCOUNTER
Medication refilled per RN protocol.   Wife,   Starr is informed prescription has been sent to pharmacy.  Release to share Personal Health Information is identified.   Advise due for office visit end of March.     Alma Plummer RN

## 2018-03-23 ENCOUNTER — DOCUMENTATION ONLY (OUTPATIENT)
Dept: LAB | Facility: CLINIC | Age: 58
End: 2018-03-23

## 2018-03-23 ENCOUNTER — OFFICE VISIT (OUTPATIENT)
Dept: FAMILY MEDICINE | Facility: CLINIC | Age: 58
End: 2018-03-23
Payer: COMMERCIAL

## 2018-03-23 VITALS
DIASTOLIC BLOOD PRESSURE: 86 MMHG | HEIGHT: 74 IN | WEIGHT: 255 LBS | RESPIRATION RATE: 20 BRPM | HEART RATE: 75 BPM | SYSTOLIC BLOOD PRESSURE: 138 MMHG | BODY MASS INDEX: 32.73 KG/M2 | OXYGEN SATURATION: 97 % | TEMPERATURE: 97 F

## 2018-03-23 DIAGNOSIS — R03.0 ELEVATED BLOOD-PRESSURE READING WITHOUT DIAGNOSIS OF HYPERTENSION: Primary | ICD-10-CM

## 2018-03-23 DIAGNOSIS — Z13.6 CARDIOVASCULAR SCREENING; LDL GOAL LESS THAN 160: ICD-10-CM

## 2018-03-23 DIAGNOSIS — R73.9 HYPERGLYCEMIA: ICD-10-CM

## 2018-03-23 DIAGNOSIS — Z12.11 SCREEN FOR COLON CANCER: ICD-10-CM

## 2018-03-23 DIAGNOSIS — Z12.5 SPECIAL SCREENING FOR MALIGNANT NEOPLASM OF PROSTATE: ICD-10-CM

## 2018-03-23 PROCEDURE — 99214 OFFICE O/P EST MOD 30 MIN: CPT | Performed by: FAMILY MEDICINE

## 2018-03-23 ASSESSMENT — ANXIETY QUESTIONNAIRES
7. FEELING AFRAID AS IF SOMETHING AWFUL MIGHT HAPPEN: NOT AT ALL
6. BECOMING EASILY ANNOYED OR IRRITABLE: NOT AT ALL
5. BEING SO RESTLESS THAT IT IS HARD TO SIT STILL: SEVERAL DAYS
3. WORRYING TOO MUCH ABOUT DIFFERENT THINGS: SEVERAL DAYS
2. NOT BEING ABLE TO STOP OR CONTROL WORRYING: NOT AT ALL
1. FEELING NERVOUS, ANXIOUS, OR ON EDGE: SEVERAL DAYS
GAD7 TOTAL SCORE: 4

## 2018-03-23 ASSESSMENT — PATIENT HEALTH QUESTIONNAIRE - PHQ9: 5. POOR APPETITE OR OVEREATING: SEVERAL DAYS

## 2018-03-23 NOTE — PROGRESS NOTES
Patient was seen on 3.23.2018. Patient did not come down to lab to get drawn and get his FIT test kit. I have cancelled and futured the renal & psa order. If this is needed,  patient will need to be contacted to come in for a lab only appointment.     Thank you,   Zoraida Ott MLT ([facility])

## 2018-03-23 NOTE — MR AVS SNAPSHOT
"              After Visit Summary   3/23/2018    Jayy Yo    MRN: [id number]           Patient Information     Date Of Birth          1960        Visit Information        Provider Department      3/23/2018 3:00 PM Vinay Moralez MD [facility]        Today's Diagnoses     Elevated blood-pressure reading without diagnosis of hypertension    -  1    Screen for colon cancer        Special screening for malignant neoplasm of prostate        CARDIOVASCULAR SCREENING; LDL GOAL LESS THAN 160        Hyperglycemia           Follow-ups after your visit        Future tests that were ordered for you today     Open Future Orders        Priority Expected Expires Ordered    Fecal colorectal cancer screen (FIT) Routine 4/10/2018 6/12/2018 3/23/2018            Who to contact     If you have questions or need follow up information about today's clinic visit or your schedule please contact [facility] directly at [phone].  Normal or non-critical lab and imaging results will be communicated to you by MyChart, letter or phone within 4 business days after the clinic has received the results. If you do not hear from us within 7 days, please contact the clinic through MyChart or phone. If you have a critical or abnormal lab result, we will notify you by phone as soon as possible.  Submit refill requests through [employer] or call your pharmacy and they will forward the refill request to us. Please allow 3 business days for your refill to be completed.          Additional Information About Your Visit        Care EveryWhere ID     This is your Care EveryWhere ID. This could be used by other organizations to access your [facility] medical records  [id number]        Your Vitals Were     Pulse Temperature Respirations Height Pulse Oximetry BMI (Body Mass Index)    75 97  F (36.1  C) (Oral) 20 6' 2\" (1.88 m) 97% 32.74 kg/m2       Blood Pressure from Last 3 Encounters:   03/23/18 138/86   12/22/17 145/79 "   06/26/17 110/74    Weight from Last 3 Encounters:   03/23/18 255 lb (115.7 kg)   12/22/17 250 lb (113.4 kg)   06/26/17 247 lb (112 kg)              We Performed the Following     PROSTATE SPEC ANTIGEN SCREEN     Renal panel (Alb, BUN, Ca, Cl, CO2, Creat, Gluc, Phos, K, Na)        Primary Care Provider Office Phone # Fax #    Vinay Moralez [phone] [address] [facility] [address]        Equal Access to Services     [facility]: Hadii aad ku hadasho Soomaali, waaxda luqadaha, qaybta kaalmada adeegyathuy, waxsal brower . So [facility] [phone].    ATENCIÓN: Si habla español, tiene a foster disposición servicios gratuitos de asistencia lingüística. [facility] [phone].    We comply with applicable federal civil rights laws and [place] laws. We do not discriminate on the basis of race, color, national origin, age, disability, sex, sexual orientation, or gender identity.            Thank you!     Thank you for choosing [facility]  for your care. Our goal is always to provide you with excellent care. Hearing back from our patients is one way we can continue to improve our services. Please take a few minutes to complete the written survey that you may receive in the mail after your visit with us. Thank you!             Your Updated Medication List - Protect others around you: Learn how to safely use, store and throw away your medicines at www.disposemymeds.org.          This list is accurate as of 3/23/18  3:35 PM.  Always use your most recent med list.                   Brand Name Dispense Instructions for use Diagnosis    HYDROcodone-acetaminophen 5-325 MG per tablet    NORCO    60 tablet    Take 1 tablet by mouth every 6 hours as needed for moderate to severe pain (max#60/month)    Eye pain, unspecified laterality       ibuprofen 200 MG tablet   Generic drug:  ibuprofen      Take 600 mg by mouth every 4 hours as needed for mild pain         metoprolol succinate 50 MG 24 hr tablet    TOPROL-XL    90 tablet    Take 1 tablet (50 mg) by mouth daily New for blood pressure take at bedtime    Hypertension goal BP (blood pressure) < 140/90       tiZANidine 4 MG tablet    ZANAFLEX    50 tablet    Take 1 tablet (4 mg) by mouth 3 times daily as needed for muscle spasms (can take 1/2 pill if sedating.)    Muscle spasm

## 2018-03-23 NOTE — PROGRESS NOTES
"SUBJECTIVE:  Jayy Yo, a 57 year old male scheduled an appointment to discuss the following issues:  Screen for colon cancer  Follow-up htn. Non-smoker. 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  No chest pain or shortness of breath. No black or bloody stools. No urine changes.  Pain stable and greatly cut down on pain meds. Sleep ok and emotionally better. No nausea, vomiting or diarrhea. No abdominal pain.   Medical, social, surgical, and family histories reviewed.    ROS:  All other ROS negative.  OBJECTIVE:  /86  Pulse 75  Temp 97  F (36.1  C) (Oral)  Resp 20  Ht 6' 2\" (1.88 m)  Wt 255 lb (115.7 kg)  SpO2 97%  BMI 32.74 kg/m2  EXAM:  GENERAL APPEARANCE: healthy, alert and no distress  RESP: lungs clear to auscultation - no rales, rhonchi or wheezes  CV: regular rates and rhythm, normal S1 S2, no S3 or S4 and no murmur, click or rub -  ABDOMEN:  soft, nontender, no HSM or masses and bowel sounds normal  MS: extremities normal- no gross deformities noted, no evidence of inflammation in joints, FROM in all extremities.  PSYCH: mentation appears normal and affect normal/bright    ASSESSMENT / PLAN:  (Z12.11) Screen for colon cancer  (primary encounter diagnosis)    Plan: Fecal colorectal cancer screen (FIT)        Prefer colonoscopy - # given.     (Z12.5) Special screening for malignant neoplasm of prostate  Plan: PROSTATE SPEC ANTIGEN SCREEN       Patient deferred exam    (Z13.6) CARDIOVASCULAR SCREENING; LDL GOAL LESS THAN 160  Comment: ok in past.   Plan: non-smoker. Non-fasting    (R73.9) Hyperglycemia  Plan: Renal panel (Alb, BUN, Ca, Cl, CO2, Creat,         Gluc, Phos, K, Na)        Continue diet/exercise and weight loss    (R03.0) Elevated blood-pressure reading without diagnosis of hypertension  Comment: stable and emotionally doing better.   Plan: Renal panel (Alb, BUN, Ca, Cl, CO2, Creat,         Gluc, Phos, K, Na)        Continue exercise. Chest pain or shortness of breath to " er.  Recheck in 6 months  Sooner if worse/self-monitor.     Vinay Moralez MD

## 2018-03-23 NOTE — NURSING NOTE
"Chief Complaint   Patient presents with     Hypertension     Health Maintenance     phq9,gretta       Initial /86  Pulse 75  Temp 97  F (36.1  C) (Oral)  Resp 20  Ht 6' 2\" (1.88 m)  Wt 255 lb (115.7 kg)  SpO2 97%  BMI 32.74 kg/m2 Estimated body mass index is 32.74 kg/(m^2) as calculated from the following:    Height as of this encounter: 6' 2\" (1.88 m).    Weight as of this encounter: 255 lb (115.7 kg).  Medication Reconciliation: complete   Jodie Olson CMA    "

## 2018-03-24 ASSESSMENT — PATIENT HEALTH QUESTIONNAIRE - PHQ9: SUM OF ALL RESPONSES TO PHQ QUESTIONS 1-9: 2

## 2018-03-24 ASSESSMENT — ANXIETY QUESTIONNAIRES: GAD7 TOTAL SCORE: 4

## 2018-04-19 ENCOUNTER — TELEPHONE (OUTPATIENT)
Dept: FAMILY MEDICINE | Facility: CLINIC | Age: 58
End: 2018-04-19

## 2018-04-19 DIAGNOSIS — H57.10 EYE PAIN, UNSPECIFIED LATERALITY: ICD-10-CM

## 2018-04-19 DIAGNOSIS — I10 HYPERTENSION GOAL BP (BLOOD PRESSURE) < 140/90: ICD-10-CM

## 2018-04-19 RX ORDER — METOPROLOL SUCCINATE 50 MG/1
50 TABLET, EXTENDED RELEASE ORAL DAILY
Qty: 90 TABLET | Refills: 0 | Status: SHIPPED | OUTPATIENT
Start: 2018-04-19 | End: 2018-09-05

## 2018-04-19 RX ORDER — HYDROCODONE BITARTRATE AND ACETAMINOPHEN 5; 325 MG/1; MG/1
1 TABLET ORAL EVERY 6 HOURS PRN
Qty: 60 TABLET | Refills: 0 | Status: SHIPPED | OUTPATIENT
Start: 2018-04-19 | End: 2018-05-31

## 2018-04-19 NOTE — TELEPHONE ENCOUNTER
Controlled Substance Refill Request for Norco  Problem List Complete:  No       PROVIDER TO CONSIDER COMPLETION OF PROBLEM LIST AND OVERVIEW/CONTROLLED SUBSTANCE AGREEMENT      Last Written Prescription Date:  2/15/18  Last Fill Quantity: 60,   # refills: 0      [place] [facility] [place]: 3/23/18          Controlled substance agreement on file: No.           checked in past 6 months?  Yes  last filled on 12/11/17    Jodie Henao RN

## 2018-04-19 NOTE — TELEPHONE ENCOUNTER
Pt is requesting refills on his blood pressure meds. And has decided he would like a refill on the Norco. Please let Jayy or Starr know when it will be done.  THanks

## 2018-05-31 ENCOUNTER — TELEPHONE (OUTPATIENT)
Dept: FAMILY MEDICINE | Facility: CLINIC | Age: 58
End: 2018-05-31

## 2018-05-31 DIAGNOSIS — H57.10 EYE PAIN, UNSPECIFIED LATERALITY: ICD-10-CM

## 2018-05-31 RX ORDER — HYDROCODONE BITARTRATE AND ACETAMINOPHEN 5; 325 MG/1; MG/1
1 TABLET ORAL EVERY 6 HOURS PRN
Qty: 60 TABLET | Refills: 0 | Status: SHIPPED | OUTPATIENT
Start: 2018-05-31 | End: 2018-07-03

## 2018-05-31 NOTE — TELEPHONE ENCOUNTER
Controlled Substance Refill Request for Norco  Problem List Complete:  No       PROVIDER TO CONSIDER COMPLETION OF PROBLEM LIST AND OVERVIEW/CONTROLLED SUBSTANCE AGREEMENT      Last Written Prescription Date:  4/19/18  Last Fill Quantity: 60,   # refills: 0      [place] [facility] [place] 3/23/18          Controlled substance agreement on file: No.           checked in past 6 months?  Yes  last filled on 5/31/18   Jodie Henao RN

## 2018-05-31 NOTE — TELEPHONE ENCOUNTER
Reason for Call:  Medication or medication refill:    Do you use a [facility]?  Name of the pharmacy and phone number for the current request:  [facility] [phone]    Name of the medication requested: HYDROcodone-acetaminophen (NORCO) 5-325 MG per tablet    Other request:     Can we leave a detailed message on this number? YES    Phone number patient can be reached at: Other phone number:  [phone]    Best Time: anytime    Call taken on 5/31/2018 at 11:13 AM by Sameer Caballero

## 2018-07-03 ENCOUNTER — TELEPHONE (OUTPATIENT)
Dept: FAMILY MEDICINE | Facility: CLINIC | Age: 58
End: 2018-07-03

## 2018-07-03 DIAGNOSIS — H57.10 EYE PAIN, UNSPECIFIED LATERALITY: ICD-10-CM

## 2018-07-03 RX ORDER — HYDROCODONE BITARTRATE AND ACETAMINOPHEN 5; 325 MG/1; MG/1
1 TABLET ORAL EVERY 6 HOURS PRN
Qty: 60 TABLET | Refills: 0 | Status: SHIPPED | OUTPATIENT
Start: 2018-07-03 | End: 2018-08-02

## 2018-07-03 NOTE — TELEPHONE ENCOUNTER
Controlled Substance Refill Request for Norco  Problem List Complete:  No     PROVIDER TO CONSIDER COMPLETION OF PROBLEM LIST AND OVERVIEW/CONTROLLED SUBSTANCE AGREEMENT    Last Written Prescription Date:  5/31/18  Last Fill Quantity: 60,   # refills: 0    Last Office Visit with [facility] [place] care provider: 3/23/18    Controlled substance agreement on file: No.      checked in past 3 months?  Yes 7/3/18, last filled on 5/31/18     Stefanie WILLIAMN, RN, CPN

## 2018-07-03 NOTE — TELEPHONE ENCOUNTER
Prior Authorization Retail Medication Request    Medication/Dose: norco  ICD code (if different than what is on RX):    Previously Tried and Failed:    Rationale:      Insurance Name:  [employer]  Insurance ID:  [id number]    Patient paid out of pocket and picked up today.  Please have prior auth backdated to 7/3/18 so we can reimburse. Thanks!    Thank You  Jaclyn Jennings, [facility]  [phone]

## 2018-07-05 NOTE — TELEPHONE ENCOUNTER
PA Initiation    Medication: norco  Insurance Company: [employer] - Phone [phone] Fax [phone]  Pharmacy Filling the Rx: [place], [place] - [address], [address]  Filling Pharmacy Phone: [phone]  Filling Pharmacy Fax:    Start Date: 7/5/2018      THIS HAS BEEN SUBMITTED BY THE PRIOR-AUTHORIZATION TEAM. ANY QUESTIONS PLEASE CALL [phone]. THANK YOU

## 2018-07-05 NOTE — TELEPHONE ENCOUNTER
Received letter from [employer], not his primary coverage.  Submitting to [employer].      PA Initiation    Medication: norco  Insurance Company: [employer] - Phone [phone] Fax [phone]  Pharmacy Filling the Rx: [place], [place] - [address], [address]  Filling Pharmacy Phone: [phone]  Filling Pharmacy Fax:    Start Date: 7/5/2018    THIS HAS BEEN SUBMITTED BY THE PRIOR-AUTHORIZATION TEAM. ANY QUESTIONS PLEASE CALL [phone]. THANK YOU

## 2018-07-06 NOTE — TELEPHONE ENCOUNTER
Per [employer] patient's primary coverage is not [employer].  Called Pharmacy and they stated he was covered under his own plan but primary coverage is now [employer] through his wife.  Called wife Denisse and she stated she just got off the phone with [employer] and updated them with this information.

## 2018-07-10 NOTE — TELEPHONE ENCOUNTER
[employer] denied claim, stating patient still has primary coverage through another insurance.  Will call patient to inform.

## 2018-07-13 NOTE — TELEPHONE ENCOUNTER
Prior Authorization Approval    Authorization Effective Date: 7/3/2018  Authorization Expiration Date: 7/3/2019  Medication: norco  Approved Dose/Quantity:   Reference #:     Insurance Company: Where Was it Filmed - Phone [phone] Fax [phone]  Expected CoPay:       CoPay Card Available:      Foundation Assistance Needed:    Which Pharmacy is filling the prescription (Not needed for infusion/clinic administered): [facility] [address], [address]  Pharmacy Notified: Yes  Patient Notified: Yes      Per call to [facility] medication has been approved.

## 2018-07-13 NOTE — TELEPHONE ENCOUNTER
Patient does not have [employer]. He only has [employer]. I just tried to back bill the 7/3/18 claim with no success. This still requires a prior authorization for a supply larger than 7 days.      Mey King    Pharmacy Technician  [facility]

## 2018-08-02 ENCOUNTER — TELEPHONE (OUTPATIENT)
Dept: FAMILY MEDICINE | Facility: CLINIC | Age: 58
End: 2018-08-02

## 2018-08-02 DIAGNOSIS — H57.10 EYE PAIN, UNSPECIFIED LATERALITY: ICD-10-CM

## 2018-08-02 RX ORDER — HYDROCODONE BITARTRATE AND ACETAMINOPHEN 5; 325 MG/1; MG/1
1 TABLET ORAL EVERY 6 HOURS PRN
Qty: 60 TABLET | Refills: 0 | Status: SHIPPED | OUTPATIENT
Start: 2018-08-02 | End: 2018-08-02

## 2018-08-02 RX ORDER — HYDROCODONE BITARTRATE AND ACETAMINOPHEN 5; 325 MG/1; MG/1
1 TABLET ORAL EVERY 6 HOURS PRN
Qty: 60 TABLET | Refills: 0 | Status: SHIPPED | OUTPATIENT
Start: 2018-09-03 | End: 2018-09-17

## 2018-08-02 NOTE — TELEPHONE ENCOUNTER
Reason for Call:  Other prescription    Detailed comments: Patient is requesting a refill for prescription Norco. Can bring prescription up to the  to Starr (wife).     Phone Number Patient can be reached at: Home number on file [phone] (home)    Best Time: Any    Can we leave a detailed message on this number? YES    Call taken on 8/2/2018 at 9:52 AM by Lianet Joseph

## 2018-08-02 NOTE — TELEPHONE ENCOUNTER
Controlled Substance Refill Request for Norco  Problem List Complete:  No      PROVIDER TO CONSIDER COMPLETION OF PROBLEM LIST AND OVERVIEW/CONTROLLED SUBSTANCE AGREEMENT     Last Written Prescription Date:  7/3/18  Last Fill Quantity: 60,   # refills: 0     [place] [facility] [place]: 3/23/18     Controlled substance agreement on file: No.      checked in past 3 months?  Yes 7/3/18    Jodie WILLIAMN, RN

## 2018-09-17 ENCOUNTER — OFFICE VISIT (OUTPATIENT)
Dept: FAMILY MEDICINE | Facility: CLINIC | Age: 58
End: 2018-09-17
Payer: COMMERCIAL

## 2018-09-17 VITALS
BODY MASS INDEX: 32.6 KG/M2 | HEART RATE: 75 BPM | DIASTOLIC BLOOD PRESSURE: 85 MMHG | RESPIRATION RATE: 20 BRPM | SYSTOLIC BLOOD PRESSURE: 139 MMHG | OXYGEN SATURATION: 96 % | WEIGHT: 254 LBS | TEMPERATURE: 97.3 F | HEIGHT: 74 IN

## 2018-09-17 DIAGNOSIS — Z12.5 SPECIAL SCREENING FOR MALIGNANT NEOPLASM OF PROSTATE: ICD-10-CM

## 2018-09-17 DIAGNOSIS — Z13.6 CARDIOVASCULAR SCREENING; LDL GOAL LESS THAN 160: ICD-10-CM

## 2018-09-17 DIAGNOSIS — H57.10 EYE PAIN, UNSPECIFIED LATERALITY: ICD-10-CM

## 2018-09-17 DIAGNOSIS — Z12.11 SCREEN FOR COLON CANCER: ICD-10-CM

## 2018-09-17 DIAGNOSIS — I10 HYPERTENSION GOAL BP (BLOOD PRESSURE) < 140/90: Primary | ICD-10-CM

## 2018-09-17 LAB
ALBUMIN SERPL-MCNC: 3.8 G/DL (ref 3.4–5)
ANION GAP SERPL CALCULATED.3IONS-SCNC: 8 MMOL/L (ref 3–14)
BUN SERPL-MCNC: 14 MG/DL (ref 7–30)
CALCIUM SERPL-MCNC: 8.7 MG/DL (ref 8.5–10.1)
CHLORIDE SERPL-SCNC: 108 MMOL/L (ref 94–109)
CHOLEST SERPL-MCNC: 175 MG/DL
CO2 SERPL-SCNC: 25 MMOL/L (ref 20–32)
CREAT SERPL-MCNC: 0.98 MG/DL (ref 0.66–1.25)
GFR SERPL CREATININE-BSD FRML MDRD: 78 ML/MIN/1.7M2
GLUCOSE SERPL-MCNC: 110 MG/DL (ref 70–99)
HDLC SERPL-MCNC: 35 MG/DL
LDLC SERPL CALC-MCNC: 101 MG/DL
NONHDLC SERPL-MCNC: 140 MG/DL
PHOSPHATE SERPL-MCNC: 3.3 MG/DL (ref 2.5–4.5)
POTASSIUM SERPL-SCNC: 4.2 MMOL/L (ref 3.4–5.3)
PSA SERPL-ACNC: 1.84 UG/L (ref 0–4)
SODIUM SERPL-SCNC: 141 MMOL/L (ref 133–144)
TRIGL SERPL-MCNC: 195 MG/DL

## 2018-09-17 PROCEDURE — 36415 COLL VENOUS BLD VENIPUNCTURE: CPT | Performed by: FAMILY MEDICINE

## 2018-09-17 PROCEDURE — 80061 LIPID PANEL: CPT | Performed by: FAMILY MEDICINE

## 2018-09-17 PROCEDURE — 99214 OFFICE O/P EST MOD 30 MIN: CPT | Performed by: FAMILY MEDICINE

## 2018-09-17 PROCEDURE — 80069 RENAL FUNCTION PANEL: CPT | Performed by: FAMILY MEDICINE

## 2018-09-17 PROCEDURE — G0103 PSA SCREENING: HCPCS | Performed by: FAMILY MEDICINE

## 2018-09-17 RX ORDER — METOPROLOL SUCCINATE 50 MG/1
TABLET, EXTENDED RELEASE ORAL
Qty: 90 TABLET | Refills: 3 | Status: SHIPPED | OUTPATIENT
Start: 2018-09-17 | End: 2019-11-21
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
## 2018-09-17 NOTE — NURSING NOTE
"Chief Complaint   Patient presents with     Hypertension     Health Maintenance     eye       Initial /85  Pulse 75  Temp 97.3  F (36.3  C) (Oral)  Resp 20  Ht 6' 2\" (1.88 m)  Wt 254 lb (115.2 kg)  SpO2 96%  BMI 32.61 kg/m2 Estimated body mass index is 32.61 kg/(m^2) as calculated from the following:    Height as of this encounter: 6' 2\" (1.88 m).    Weight as of this encounter: 254 lb (115.2 kg).    Jodie Olson CMA    "

## 2018-09-17 NOTE — MR AVS SNAPSHOT
"              After Visit Summary   9/17/2018    Jayy Yo    MRN: [id number]           Patient Information     Date Of Birth          1960        Visit Information        Provider Department      9/17/2018 7:35 AM Vinay Moralez MD [facility]        Today's Diagnoses     Hypertension goal BP (blood pressure) < 140/90    -  1    Eye pain, unspecified laterality        Screen for colon cancer        Special screening for malignant neoplasm of prostate        CARDIOVASCULAR SCREENING; LDL GOAL LESS THAN 160           Follow-ups after your visit        Future tests that were ordered for you today     Open Future Orders        Priority Expected Expires Ordered    Fecal colorectal cancer screen (FIT) Routine 10/8/2018 12/10/2018 9/17/2018            Who to contact     If you have questions or need follow up information about today's clinic visit or your schedule please contact [facility] directly at [phone].  Normal or non-critical lab and imaging results will be communicated to you by MyChart, letter or phone within 4 business days after the clinic has received the results. If you do not hear from us within 7 days, please contact the clinic through MyChart or phone. If you have a critical or abnormal lab result, we will notify you by phone as soon as possible.  Submit refill requests through [employer] or call your pharmacy and they will forward the refill request to us. Please allow 3 business days for your refill to be completed.          Additional Information About Your Visit        Care EveryWhere ID     This is your Care EveryWhere ID. This could be used by other organizations to access your [place] medical records  [id number]        Your Vitals Were     Pulse Temperature Respirations Height Pulse Oximetry BMI (Body Mass Index)    75 97.3  F (36.3  C) (Oral) 20 6' 2\" (1.88 m) 96% 32.61 kg/m2       Blood Pressure from Last 3 Encounters:   09/17/18 139/85   03/23/18 " 138/86   12/22/17 145/79    Weight from Last 3 Encounters:   09/17/18 254 lb (115.2 kg)   03/23/18 255 lb (115.7 kg)   12/22/17 250 lb (113.4 kg)              We Performed the Following     Lipid panel reflex to direct LDL Fasting     PROSTATE SPEC ANTIGEN SCREEN     Renal panel (Alb, BUN, Ca, Cl, CO2, Creat, Gluc, Phos, K, Na)          Today's Medication Changes          These changes are accurate as of 9/17/18  8:04 AM.  If you have any questions, ask your nurse or doctor.               These medicines have changed or have updated prescriptions.        Dose/Directions    metoprolol succinate 50 MG 24 hr tablet   Commonly known as:  TOPROL-XL   This may have changed:  See the new instructions.   Used for:  Hypertension goal BP (blood pressure) < 140/90   Changed by:  Vinay Moralez MD        TAKE ONE TABLET BY MOUTH EVERY NIGHT AT BEDTIME for blood pressure   Quantity:  90 tablet   Refills:  3            Where to get your medicines      These medications were sent to [address], [address] [address]     Phone:  [phone]     metoprolol succinate 50 MG 24 hr tablet                Primary Care Provider Office Phone # Fax #    Vinay Moralez [phone]       [address] [place] [address]        Equal Access to Services     [facility] : Hadii laura ku hadasho Soomaali, waaxda luqadaha, qaybta kaalmada ademollyyada, chay lópez. So [facility] [phone].    ATENCIÓN: Si habla español, tiene a foster disposición servicios gratuitos de asistencia lingüística. Porter al [phone].    We comply with applicable federal civil rights laws and [place] laws. We do not discriminate on the basis of race, color, national origin, age, disability, sex, sexual orientation, or gender identity.            Thank you!     Thank you for choosing [facility]  for your care. Our goal  is always to provide you with excellent care. Hearing back from our patients is one way we can continue to improve our services. Please take a few minutes to complete the written survey that you may receive in the mail after your visit with us. Thank you!             Your Updated Medication List - Protect others around you: Learn how to safely use, store and throw away your medicines at www.disposemymeds.org.          This list is accurate as of 9/17/18  8:04 AM.  Always use your most recent med list.                   Brand Name Dispense Instructions for use Diagnosis    ibuprofen 200 MG tablet   Generic drug:  ibuprofen      Take 600 mg by mouth every 4 hours as needed for mild pain        metoprolol succinate 50 MG 24 hr tablet    TOPROL-XL    90 tablet    TAKE ONE TABLET BY MOUTH EVERY NIGHT AT BEDTIME for blood pressure    Hypertension goal BP (blood pressure) < 140/90

## 2018-09-17 NOTE — LETTER
September 18, 2018    Jayy Yo  [address] [facility] [address]        Dear Jayy,    Generally normal results except blood sugar and triglycerides a little high. Lower simple carbs/sugars in diet. Normal kidneys and prostate tests.    If you have any questions or concerns, please call myself or my nurse at [phone].    Sincerely,    .Vinay Moralez MD/fransisca      Results for orders placed or performed in visit on 09/17/18   PROSTATE SPEC ANTIGEN SCREEN   Result Value Ref Range    PSA 1.84 0 - 4 ug/L   Lipid panel reflex to direct LDL Fasting   Result Value Ref Range    Cholesterol 175 <200 mg/dL    Triglycerides 195 (H) <150 mg/dL    HDL Cholesterol 35 (L) >39 mg/dL    LDL Cholesterol Calculated 101 (H) <100 mg/dL    Non HDL Cholesterol 140 (H) <130 mg/dL   Renal panel (Alb, BUN, Ca, Cl, CO2, Creat, Gluc, Phos, K, Na)   Result Value Ref Range    Sodium 141 133 - 144 mmol/L    Potassium 4.2 3.4 - 5.3 mmol/L    Chloride 108 94 - 109 mmol/L    Carbon Dioxide 25 20 - 32 mmol/L    Anion Gap 8 3 - 14 mmol/L    Glucose 110 (H) 70 - 99 mg/dL    Urea Nitrogen 14 7 - 30 mg/dL    Creatinine 0.98 0.66 - 1.25 mg/dL    GFR Estimate 78 >60 mL/min/1.7m2    GFR Estimate If Black >90 >60 mL/min/1.7m2    Calcium 8.7 8.5 - 10.1 mg/dL    Phosphorus 3.3 2.5 - 4.5 mg/dL    Albumin 3.8 3.4 - 5.0 g/dL

## 2018-11-30 DIAGNOSIS — Z12.11 SCREEN FOR COLON CANCER: ICD-10-CM

## 2018-11-30 PROCEDURE — 82274 ASSAY TEST FOR BLOOD FECAL: CPT | Performed by: FAMILY MEDICINE

## 2018-12-02 LAB — HEMOCCULT STL QL IA: NEGATIVE

## 2019-05-09 ENCOUNTER — DOCUMENTATION ONLY (OUTPATIENT)
Dept: FAMILY MEDICINE | Facility: CLINIC | Age: 59
End: 2019-05-09

## 2019-05-09 DIAGNOSIS — R03.0 ELEVATED BLOOD PRESSURE READING WITHOUT DIAGNOSIS OF HYPERTENSION: ICD-10-CM

## 2019-05-09 DIAGNOSIS — Z00.00 ROUTINE HISTORY AND PHYSICAL EXAMINATION OF ADULT: ICD-10-CM

## 2019-05-09 DIAGNOSIS — R73.9 HYPERGLYCEMIA: ICD-10-CM

## 2019-05-09 DIAGNOSIS — Z13.6 CARDIOVASCULAR SCREENING; LDL GOAL LESS THAN 160: Primary | ICD-10-CM

## 2019-05-09 NOTE — PROGRESS NOTES
.Per scrubbing protocol patient is not due for labs at this time. Please review chart, If nothing is needed please contact the patient and cancel the lab appointment.   Patients upcoming appointment is 5/17/19    Thank you,  Kallie

## 2019-05-17 DIAGNOSIS — R03.0 ELEVATED BLOOD PRESSURE READING WITHOUT DIAGNOSIS OF HYPERTENSION: ICD-10-CM

## 2019-05-17 DIAGNOSIS — Z00.00 ROUTINE HISTORY AND PHYSICAL EXAMINATION OF ADULT: ICD-10-CM

## 2019-05-17 DIAGNOSIS — R73.9 HYPERGLYCEMIA: ICD-10-CM

## 2019-05-17 LAB
ALBUMIN SERPL-MCNC: 4.1 G/DL (ref 3.4–5)
ALBUMIN UR-MCNC: NEGATIVE MG/DL
ALP SERPL-CCNC: 51 U/L (ref 40–150)
ALT SERPL W P-5'-P-CCNC: 32 U/L (ref 0–70)
ANION GAP SERPL CALCULATED.3IONS-SCNC: 9 MMOL/L (ref 3–14)
APPEARANCE UR: CLEAR
AST SERPL W P-5'-P-CCNC: 19 U/L (ref 0–45)
BILIRUB SERPL-MCNC: 0.4 MG/DL (ref 0.2–1.3)
BILIRUB UR QL STRIP: NEGATIVE
BUN SERPL-MCNC: 12 MG/DL (ref 7–30)
CALCIUM SERPL-MCNC: 9 MG/DL (ref 8.5–10.1)
CHLORIDE SERPL-SCNC: 105 MMOL/L (ref 94–109)
CO2 SERPL-SCNC: 26 MMOL/L (ref 20–32)
COLOR UR AUTO: YELLOW
CREAT SERPL-MCNC: 0.95 MG/DL (ref 0.66–1.25)
ERYTHROCYTE [DISTWIDTH] IN BLOOD BY AUTOMATED COUNT: 12.9 % (ref 10–15)
GFR SERPL CREATININE-BSD FRML MDRD: 87 ML/MIN/{1.73_M2}
GLUCOSE SERPL-MCNC: 105 MG/DL (ref 70–99)
GLUCOSE UR STRIP-MCNC: NEGATIVE MG/DL
HCT VFR BLD AUTO: 45.6 % (ref 40–53)
HGB BLD-MCNC: 15.5 G/DL (ref 13.3–17.7)
HGB UR QL STRIP: NEGATIVE
KETONES UR STRIP-MCNC: NEGATIVE MG/DL
LEUKOCYTE ESTERASE UR QL STRIP: NEGATIVE
MCH RBC QN AUTO: 31.8 PG (ref 26.5–33)
MCHC RBC AUTO-ENTMCNC: 34 G/DL (ref 31.5–36.5)
MCV RBC AUTO: 93 FL (ref 78–100)
NITRATE UR QL: NEGATIVE
PH UR STRIP: 5 PH (ref 5–7)
PLATELET # BLD AUTO: 278 10E9/L (ref 150–450)
POTASSIUM SERPL-SCNC: 4.3 MMOL/L (ref 3.4–5.3)
PROT SERPL-MCNC: 7.7 G/DL (ref 6.8–8.8)
RBC # BLD AUTO: 4.88 10E12/L (ref 4.4–5.9)
SODIUM SERPL-SCNC: 140 MMOL/L (ref 133–144)
SOURCE: NORMAL
SP GR UR STRIP: >1.03 (ref 1–1.03)
UROBILINOGEN UR STRIP-ACNC: 0.2 EU/DL (ref 0.2–1)
WBC # BLD AUTO: 6 10E9/L (ref 4–11)

## 2019-05-17 PROCEDURE — 81003 URINALYSIS AUTO W/O SCOPE: CPT | Performed by: FAMILY MEDICINE

## 2019-05-17 PROCEDURE — 85027 COMPLETE CBC AUTOMATED: CPT | Performed by: FAMILY MEDICINE

## 2019-05-17 PROCEDURE — 36415 COLL VENOUS BLD VENIPUNCTURE: CPT | Performed by: FAMILY MEDICINE

## 2019-05-17 PROCEDURE — 80053 COMPREHEN METABOLIC PANEL: CPT | Performed by: FAMILY MEDICINE

## 2019-05-23 ENCOUNTER — OFFICE VISIT (OUTPATIENT)
Dept: FAMILY MEDICINE | Facility: CLINIC | Age: 59
End: 2019-05-23
Payer: COMMERCIAL

## 2019-05-23 VITALS
BODY MASS INDEX: 30.67 KG/M2 | WEIGHT: 239 LBS | SYSTOLIC BLOOD PRESSURE: 130 MMHG | DIASTOLIC BLOOD PRESSURE: 82 MMHG | TEMPERATURE: 98.3 F | HEART RATE: 70 BPM | HEIGHT: 74 IN | OXYGEN SATURATION: 93 % | RESPIRATION RATE: 20 BRPM

## 2019-05-23 DIAGNOSIS — H57.10 EYE PAIN, UNSPECIFIED LATERALITY: ICD-10-CM

## 2019-05-23 DIAGNOSIS — I10 HYPERTENSION GOAL BP (BLOOD PRESSURE) < 140/90: ICD-10-CM

## 2019-05-23 DIAGNOSIS — Z00.00 ROUTINE HISTORY AND PHYSICAL EXAMINATION OF ADULT: Primary | ICD-10-CM

## 2019-05-23 PROCEDURE — 99396 PREV VISIT EST AGE 40-64: CPT | Performed by: FAMILY MEDICINE

## 2019-05-23 ASSESSMENT — ENCOUNTER SYMPTOMS
MYALGIAS: 0
FEVER: 0
HEARTBURN: 0
HEMATURIA: 0
SHORTNESS OF BREATH: 0
ABDOMINAL PAIN: 0
WEAKNESS: 0
PALPITATIONS: 0
SORE THROAT: 0
DIARRHEA: 0
JOINT SWELLING: 0
NAUSEA: 0
CONSTIPATION: 0
DYSURIA: 0
CHILLS: 0
PARESTHESIAS: 0
FREQUENCY: 0
HEADACHES: 1
HEMATOCHEZIA: 0
COUGH: 0
ARTHRALGIAS: 1
DIZZINESS: 0
EYE PAIN: 1
NERVOUS/ANXIOUS: 0

## 2019-05-23 ASSESSMENT — PATIENT HEALTH QUESTIONNAIRE - PHQ9: SUM OF ALL RESPONSES TO PHQ QUESTIONS 1-9: 0

## 2019-05-23 ASSESSMENT — MIFFLIN-ST. JEOR: SCORE: 1973.85

## 2019-05-23 NOTE — PROGRESS NOTES
SUBJECTIVE:   CC: Jayy Yo is an 58 year old male who presents for preventative health visit.   Follow-up chronic eye pain and htn.  Stressful with work. Going to see eye provider. Dirt gets in eye. Safety glasses.   Some weight loss and diet. Exercise - active.   No chest pain or shortness of breath.   Non-smoker. iburpofen prn.   Sleep improving. Lots of milk.   Mom alive 91yo. Dad  73yo - aneurysm.   Healthy Habits:     Getting at least 3 servings of Calcium per day:  Yes    Bi-annual eye exam:  NO    Dental care twice a year:  Yes    Sleep apnea or symptoms of sleep apnea:  None    Diet:  Regular (no restrictions)    Frequency of exercise:  4-5 days/week    Duration of exercise:  N/A    Medication side effects:  None    PHQ-2 Total Score: 0    Additional concerns today:  No      PROBLEMS TO ADD ON...    Today's PHQ-2 Score:   PHQ-2 (  [employer]) 2019   Q1: Little interest or pleasure in doing things 0   Q2: Feeling down, depressed or hopeless 0   PHQ-2 Score 0   Q1: Little interest or pleasure in doing things Not at all   Q2: Feeling down, depressed or hopeless Not at all   PHQ-2 Score 0       Abuse: Current or Past(Physical, Sexual or Emotional)- No  Do you feel safe in your environment? Yes    Social History     Tobacco Use     Smoking status: Never Smoker     Smokeless tobacco: Never Used     Tobacco comment: Lives in smoke free household   Substance Use Topics     Alcohol use: Yes     Alcohol/week: 0.0 oz     Comment: social         Alcohol Use 2019   Prescreen: >3 drinks/day or >7 drinks/week? No   Prescreen: >3 drinks/day or >7 drinks/week? -       Last PSA:   PSA   Date Value Ref Range Status   2018 1.84 0 - 4 ug/L Final     Comment:     Assay Method:  Chemiluminescence using Siemens Vista analyzer       Reviewed orders with patient. Reviewed health maintenance and updated orders accordingly - Yes  Lab work is in process    Reviewed and updated as needed this visit by clinical  "staff         Reviewed and updated as needed this visit by Provider            Review of Systems   Constitutional: Negative for chills and fever.   HENT: Positive for hearing loss. Negative for congestion, ear pain and sore throat.    Eyes: Positive for pain and visual disturbance.   Respiratory: Negative for cough and shortness of breath.    Cardiovascular: Negative for chest pain, palpitations and peripheral edema.   Gastrointestinal: Negative for abdominal pain, constipation, diarrhea, heartburn, hematochezia and nausea.   Genitourinary: Negative for dysuria, frequency, genital sores, hematuria, impotence and urgency.   Musculoskeletal: Positive for arthralgias. Negative for joint swelling and myalgias.   Skin: Negative for rash.   Neurological: Positive for headaches. Negative for dizziness, weakness and paresthesias.   Psychiatric/Behavioral: Negative for mood changes. The patient is not nervous/anxious.      All other ROS negative.       OBJECTIVE:   /82   Pulse 70   Temp 98.3  F (36.8  C) (Oral)   Resp 20   Ht 1.88 m (6' 2\")   Wt 108.4 kg (239 lb)   SpO2 93%   BMI 30.69 kg/m      Physical Exam  GENERAL: healthy, alert and no distress  EYES: Eyes grossly normal to inspection, PERRL and conjunctivae and sclerae normal  HENT: ear canals and TM's normal, nose and mouth without ulcers or lesions  NECK: no adenopathy, no asymmetry, masses, or scars and thyroid normal to palpation  RESP: lungs clear to auscultation - no rales, rhonchi or wheezes  BREAST: normal without masses, tenderness or nipple discharge and no palpable axillary masses or adenopathy  CV: regular rate and rhythm, normal S1 S2, no S3 or S4, no murmur, click or rub, no peripheral edema and peripheral pulses strong  ABDOMEN: soft, nontender, no hepatosplenomegaly, no masses and bowel sounds normal   (male): patient deferred /rectal exams. Denies pain/masses/hernia  MS: no gross musculoskeletal defects noted, no edema  SKIN: no " "suspicious lesions or rashes  NEURO: Normal strength and tone, mentation intact and speech normal  BACK: no CVA tenderness, no paralumbar tenderness  PSYCH: mentation appears normal, affect normal/bright  LYMPH: no cervical, supraclavicular, axillary, or inguinal adenopathy        ASSESSMENT/PLAN:   ASSESSMENT / PLAN:  (Z00.00) Routine history and physical examination of adult  (primary encounter diagnosis)  Comment: generally healthy and normal exam/labs  Plan: Reviewed self mole/testicle check handout.      (H57.10) Eye pain, unspecified laterality  Comment: chronic  Plan: seeing eye specialist. Prn iburpofen.     (I10) Hypertension goal BP (blood pressure) < 140/90  Comment: stable  Plan: self-monitor - wife with cuff. Limit sodium. Exercise. Chest pain or shortness of breath to er. No family history and normal lipids/non-smoker. Recheck in 6 months  Sooner if worse/new issues.         COUNSELING:   Reviewed preventive health counseling, as reflected in patient instructions       Regular exercise       Healthy diet/nutrition       Vision screening       Hearing screening       Colon cancer screening       Prostate cancer screening       Osteoporosis Prevention/Bone Health    Estimated body mass index is 32.61 kg/m  as calculated from the following:    Height as of 9/17/18: 1.88 m (6' 2\").    Weight as of 9/17/18: 115.2 kg (254 lb).          reports that he has never smoked. He has never used smokeless tobacco.      Counseling Resources:  ATP IV Guidelines  Pooled Cohorts Equation Calculator  FRAX Risk Assessment  ICSI Preventive Guidelines  Dietary Guidelines for Americans, 2010  USDA's MyPlate  ASA Prophylaxis  Lung CA Screening    Vinay Moralez MD  [facility]  "

## 2019-05-23 NOTE — NURSING NOTE
"Chief Complaint   Patient presents with     Physical     ph9       Initial /82   Pulse 70   Temp 98.3  F (36.8  C) (Oral)   Resp 20   Ht 1.88 m (6' 2\")   Wt 108.4 kg (239 lb)   SpO2 93%   BMI 30.69 kg/m   Estimated body mass index is 30.69 kg/m  as calculated from the following:    Height as of this encounter: 1.88 m (6' 2\").    Weight as of this encounter: 108.4 kg (239 lb).    Jodie Olson CMA    "

## 2019-07-17 ENCOUNTER — TRANSFERRED RECORDS (OUTPATIENT)
Dept: HEALTH INFORMATION MANAGEMENT | Facility: CLINIC | Age: 59
End: 2019-07-17

## 2019-08-26 ENCOUNTER — OFFICE VISIT (OUTPATIENT)
Dept: FAMILY MEDICINE | Facility: CLINIC | Age: 59
End: 2019-08-26
Payer: OTHER MISCELLANEOUS

## 2019-08-26 VITALS
WEIGHT: 238 LBS | OXYGEN SATURATION: 98 % | BODY MASS INDEX: 30.56 KG/M2 | DIASTOLIC BLOOD PRESSURE: 78 MMHG | TEMPERATURE: 98.7 F | SYSTOLIC BLOOD PRESSURE: 147 MMHG | RESPIRATION RATE: 20 BRPM | HEART RATE: 60 BPM

## 2019-08-26 DIAGNOSIS — M70.22 OLECRANON BURSITIS, LEFT ELBOW: Primary | ICD-10-CM

## 2019-08-26 PROCEDURE — 99213 OFFICE O/P EST LOW 20 MIN: CPT | Performed by: FAMILY MEDICINE

## 2019-08-26 RX ORDER — CLINDAMYCIN HCL 300 MG
300 CAPSULE ORAL 4 TIMES DAILY
Qty: 40 CAPSULE | Refills: 0 | Status: SHIPPED | OUTPATIENT
Start: 2019-08-26 | End: 2019-11-21

## 2019-08-26 ASSESSMENT — PAIN SCALES - GENERAL: PAINLEVEL: EXTREME PAIN (8)

## 2019-08-26 NOTE — NURSING NOTE
"Chief Complaint   Patient presents with     Elbow Pain     possible infection, pointed by a piece of sheet metal.      Health Maintenance     orders pended, FARZAD       Initial BP (!) 147/78   Pulse 60   Temp 98.7  F (37.1  C) (Oral)   Resp 20   Wt 108 kg (238 lb)   SpO2 98%   BMI 30.56 kg/m   Estimated body mass index is 30.56 kg/m  as calculated from the following:    Height as of 5/23/19: 1.88 m (6' 2\").    Weight as of this encounter: 108 kg (238 lb).  Medication Reconciliation: complete     Jaki Abel CMA  "

## 2019-08-26 NOTE — PROGRESS NOTES
SUBJECTIVE:  Jayy Yo is a 59 year old male who is seen as self referral for  left elbow pain.    He reports that he had an injury at work.   He bumped his left elbow into a sharp piece of sheet metal about 2 month(s) ago. It was stuck pretty deep in his skin. There was a scab that never totally healed.   Last week it started swelling and it started to hurt.       He did feel pretty rough last weekend but denies any obvious fever.  He denies any drainage from the elbow.        The patient reports that he is employed as a .  Social History     Socioeconomic History     Marital status:      Spouse name: Not on file     Number of children: Not on file     Years of education: Not on file     Highest education level: Not on file   Occupational History     Not on file   Social Needs     Financial resource strain: Not on file     Food insecurity:     Worry: Not on file     Inability: Not on file     Transportation needs:     Medical: Not on file     Non-medical: Not on file   Tobacco Use     Smoking status: Never Smoker     Smokeless tobacco: Never Used     Tobacco comment: Lives in smoke free household   Substance and Sexual Activity     Alcohol use: Yes     Alcohol/week: 0.0 oz     Comment: social     Drug use: No     Sexual activity: Yes     Partners: Female     Birth control/protection: Pill   Lifestyle     Physical activity:     Days per week: Not on file     Minutes per session: Not on file     Stress: Not on file   Relationships     Social connections:     Talks on phone: Not on file     Gets together: Not on file     Attends [employer] service: Not on file     Active member of club or organization: Not on file     Attends meetings of clubs or organizations: Not on file     Relationship status: Not on file     Intimate partner violence:     Fear of current or ex partner: Not on file     Emotionally abused: Not on file     Physically abused: Not on file     Forced sexual activity: Not on file    Other Topics Concern     Parent/sibling w/ CABG, MI or angioplasty before 65F 55M? Not Asked      Service Yes     Blood Transfusions No     Caffeine Concern No     Occupational Exposure Yes     Comment:      Hobby Hazards No     Sleep Concern No     Stress Concern No     Weight Concern No     Special Diet No     Back Care No     Exercise Yes     Bike Helmet Yes     Seat Belt Yes     Self-Exams Yes   Social History Narrative     Not on file         REVIEW OF SYSTEMS:  CONSTITUTIONAL:NEGATIVE for fever, chills, change in weight      OBJECTIVE:  There were no vitals taken for this visit.  GENERAL APPEARANCE: healthy, alert and no distress    PSYCH:  mentation appears normal and affect normal/bright    ELBOW EXAM:  MUSCULOSKELETAL:  LEFT ELBOW:  Inspection: moderate erythematous of the extensor side of the elbow.  It was warm  and tender to touch.  Moderate olecranon bursa swelling. No skin defect or drainage.      Range of Motion: flexion: mildly diminished, extension: mildly diminished  Strength: elbow strength full  ASSESSMENT  Differential Diagnosis includes:  Cellulitis and Olecranon bursitis      PLAN:   Start clindamycin 300 mg qid for 10 day(s)   Follow up with orthopedic(s) in 10 day(s) if not better.

## 2019-08-26 NOTE — PATIENT INSTRUCTIONS
Your provider has referred you to: [facility]: [facility] [phone]   [email or web address]  [facility] [phone]    [email or web address]    Make an appointment(s) with orthopedic(s) for about 10 day(s) from now.

## 2019-09-13 ENCOUNTER — TELEPHONE (OUTPATIENT)
Dept: FAMILY MEDICINE | Facility: CLINIC | Age: 59
End: 2019-09-13

## 2019-09-13 NOTE — TELEPHONE ENCOUNTER
Called wife Starr.     She said patient's bursitis improved since starting the clindamycin but its still very painful.   Patient is a  and has been working long hours so has not rested it much.  Uses a heating pad at night.   Patient and wife wonder if patient should have another round of antibiotic.     This RN informed her that providers plan says that if he is not improved after the clindamycin, to see ortho.  Referral was already ordered by provider.     Wife understands this and will make appointment to see ortho.    Mariana Crockett BSN, RN

## 2019-09-13 NOTE — TELEPHONE ENCOUNTER
Reason for Call:  Other medication     Detailed comments: Jayy finished his round of antibiotics however the infection has not cleared up quite yet. Should I take another round of antibiotics? Please call Starr at the  at [place]. Thank you    Phone Number Patient can be reached at: Other phone number:  [phone]      Best Time: Until 3:00 pm today    Can we leave a detailed message on this number? NO    Call taken on 9/13/2019 at 9:27 AM by Ella Rehman

## 2019-11-04 ENCOUNTER — TELEPHONE (OUTPATIENT)
Dept: OPHTHALMOLOGY | Facility: CLINIC | Age: 59
End: 2019-11-04

## 2019-11-04 ENCOUNTER — OFFICE VISIT (OUTPATIENT)
Dept: OPHTHALMOLOGY | Facility: CLINIC | Age: 59
End: 2019-11-04
Payer: COMMERCIAL

## 2019-11-04 DIAGNOSIS — H26.493 PCO (POSTERIOR CAPSULAR OPACIFICATION), BILATERAL: ICD-10-CM

## 2019-11-04 DIAGNOSIS — Z98.890 S/P YAG CAPSULOTOMY, BILATERAL: Primary | ICD-10-CM

## 2019-11-04 PROCEDURE — 92002 INTRM OPH EXAM NEW PATIENT: CPT | Performed by: OPHTHALMOLOGY

## 2019-11-04 RX ORDER — PREDNISOLONE ACETATE 10 MG/ML
1-2 SUSPENSION/ DROPS OPHTHALMIC 4 TIMES DAILY
Qty: 1 BOTTLE | Refills: 0 | Status: SHIPPED | OUTPATIENT
Start: 2019-11-04 | End: 2019-11-21

## 2019-11-04 ASSESSMENT — TONOMETRY
IOP_METHOD: TONOPEN
OS_IOP_MMHG: 21
OD_IOP_MMHG: 20

## 2019-11-04 ASSESSMENT — VISUAL ACUITY
OS_PH_SC+: -2
OS_SC: 20/50
METHOD: SNELLEN - LINEAR
OS_PH_SC: 20/30
OD_PH_SC: 20/200
OD_SC: 20/400

## 2019-11-04 ASSESSMENT — CUP TO DISC RATIO
OD_RATIO: 0.2
OS_RATIO: 0.2

## 2019-11-04 ASSESSMENT — SLIT LAMP EXAM - LIDS
COMMENTS: NORMAL
COMMENTS: NORMAL

## 2019-11-04 ASSESSMENT — EXTERNAL EXAM - RIGHT EYE: OD_EXAM: NORMAL

## 2019-11-04 ASSESSMENT — EXTERNAL EXAM - LEFT EYE: OS_EXAM: NORMAL

## 2019-11-04 NOTE — NURSING NOTE
Chief Complaints and History of Present Illnesses   Patient presents with     Yag Laser       Chief Complaint(s) and History of Present Illness(es)     Yag Laser               Comments     S/P Phaco each eye 2015. Recently had appt with Dr Melvin at [facility] and was recommended to come back to Dr Verduzco for YAG. Patient states that right eye seems worse than left eye.                Melanie Jeans, OA

## 2019-11-04 NOTE — TELEPHONE ENCOUNTER
[facility]    Phone Message    May a detailed message be left on voicemail: yes    Reason for Call: Other: Patient called having questions regarding appts that are scheduled on 11/11/19. please advise     Action Taken: Message routed to:  Adult Clinics: Eye p [phone]

## 2019-11-04 NOTE — TELEPHONE ENCOUNTER
Spoke with pharmacist needing clarification on Rx sent for patient. Dr Verduzco did not specify length of time to take drops. I gave information needed.    Melanie Jeans, OA

## 2019-11-11 ENCOUNTER — OFFICE VISIT (OUTPATIENT)
Dept: OPHTHALMOLOGY | Facility: CLINIC | Age: 59
End: 2019-11-11
Payer: COMMERCIAL

## 2019-11-11 DIAGNOSIS — Z98.890 S/P YAG CAPSULOTOMY, BILATERAL: Primary | ICD-10-CM

## 2019-11-11 PROCEDURE — 99024 POSTOP FOLLOW-UP VISIT: CPT | Performed by: OPHTHALMOLOGY

## 2019-11-11 ASSESSMENT — REFRACTION_MANIFEST
OS_AXIS: 164
OD_ADD: +2.50
OD_CYLINDER: +0.75
OD_SPHERE: +1.00
OS_CYLINDER: +1.00
OS_SPHERE: +1.25
OD_AXIS: 073
OS_ADD: +2.50

## 2019-11-11 ASSESSMENT — TONOMETRY
OD_IOP_MMHG: 24
IOP_METHOD: TONOPEN
OS_IOP_MMHG: 28
OS_IOP_MMHG: 19
OD_IOP_MMHG: 26
IOP_METHOD: TONOPEN

## 2019-11-11 ASSESSMENT — VISUAL ACUITY
OS_SC: 20/25
OD_SC: 20/20
METHOD: SNELLEN - LINEAR
CORRECTION_TYPE: GLASSES

## 2019-11-11 NOTE — NURSING NOTE
Chief Complaints and History of Present Illnesses   Patient presents with     Post-op Yag Capsulotomy      Chief Complaint(s) and History of Present Illness(es)     Post-op Yag Capsulotomy               Comments     Post op yag each eye.  Patient says vision is better after procedure.  Eye meds: PF each eye  MIKEL Sewell 11/11/2019 9:30 AM

## 2019-11-12 ASSESSMENT — SLIT LAMP EXAM - LIDS
COMMENTS: NORMAL
COMMENTS: NORMAL

## 2019-11-12 ASSESSMENT — CUP TO DISC RATIO
OD_RATIO: 0.2
OS_RATIO: 0.2

## 2019-11-12 ASSESSMENT — EXTERNAL EXAM - RIGHT EYE: OD_EXAM: NORMAL

## 2019-11-12 ASSESSMENT — EXTERNAL EXAM - LEFT EYE: OS_EXAM: NORMAL

## 2019-11-12 NOTE — PROGRESS NOTES
Assessment & Plan   Jayy Yo is a 59 year old male who presents with:   Review of systems for the eyes was negative other than the pertinent positives and negatives noted in the HPI.    S/P YAG capsulotomy, bilateral  - Looks great  - Nice improvement in vision  - Stop PF    s/p SB OD    Return in 12 months for annual exam.      Attending Physician Attestation:  Complete documentation of historical and exam elements from today's encounter can be found in the full encounter summary report (not reduplicated in this progress note).  I personally obtained the chief complaint(s) and history of present illness.  I confirmed and edited as necessary the review of systems, past medical/surgical history, family history, social history, and examination findings as documented by others; and I examined the patient myself.  I personally reviewed the relevant tests, images, and reports as documented above.  I formulated and edited as necessary the assessment and plan and discussed the findings and management plan with the patient and family. - Porfirio Verduzco MD

## 2019-11-21 ENCOUNTER — OFFICE VISIT (OUTPATIENT)
Dept: FAMILY MEDICINE | Facility: CLINIC | Age: 59
End: 2019-11-21
Payer: COMMERCIAL

## 2019-11-21 VITALS
TEMPERATURE: 98.3 F | BODY MASS INDEX: 31.44 KG/M2 | HEIGHT: 74 IN | DIASTOLIC BLOOD PRESSURE: 75 MMHG | SYSTOLIC BLOOD PRESSURE: 132 MMHG | WEIGHT: 245 LBS | RESPIRATION RATE: 18 BRPM | HEART RATE: 56 BPM

## 2019-11-21 DIAGNOSIS — Z13.6 CARDIOVASCULAR SCREENING; LDL GOAL LESS THAN 160: ICD-10-CM

## 2019-11-21 DIAGNOSIS — Z12.11 SPECIAL SCREENING FOR MALIGNANT NEOPLASMS, COLON: ICD-10-CM

## 2019-11-21 DIAGNOSIS — Z12.5 SCREENING PSA (PROSTATE SPECIFIC ANTIGEN): ICD-10-CM

## 2019-11-21 DIAGNOSIS — I10 HYPERTENSION GOAL BP (BLOOD PRESSURE) < 140/90: Primary | ICD-10-CM

## 2019-11-21 LAB
CHOLEST SERPL-MCNC: 203 MG/DL
HDLC SERPL-MCNC: 42 MG/DL
LDLC SERPL CALC-MCNC: 142 MG/DL
NONHDLC SERPL-MCNC: 161 MG/DL
PSA SERPL-ACNC: 2.42 UG/L (ref 0–4)
TRIGL SERPL-MCNC: 97 MG/DL

## 2019-11-21 PROCEDURE — 80061 LIPID PANEL: CPT | Performed by: FAMILY MEDICINE

## 2019-11-21 PROCEDURE — 36415 COLL VENOUS BLD VENIPUNCTURE: CPT | Performed by: FAMILY MEDICINE

## 2019-11-21 PROCEDURE — 99214 OFFICE O/P EST MOD 30 MIN: CPT | Performed by: FAMILY MEDICINE

## 2019-11-21 PROCEDURE — G0103 PSA SCREENING: HCPCS | Performed by: FAMILY MEDICINE

## 2019-11-21 RX ORDER — METOPROLOL SUCCINATE 50 MG/1
TABLET, EXTENDED RELEASE ORAL
Qty: 90 TABLET | Refills: 3 | Status: SHIPPED | OUTPATIENT
Start: 2019-11-21 | End: 2020-09-11

## 2019-11-21 ASSESSMENT — MIFFLIN-ST. JEOR: SCORE: 1996.06

## 2019-11-21 NOTE — NURSING NOTE
"Chief Complaint   Patient presents with     Hypertension       Initial /75   Pulse 56   Temp 98.3  F (36.8  C) (Oral)   Resp 18   Ht 1.88 m (6' 2\")   Wt 111.1 kg (245 lb)   BMI 31.46 kg/m   Estimated body mass index is 31.46 kg/m  as calculated from the following:    Height as of this encounter: 1.88 m (6' 2\").    Weight as of this encounter: 111.1 kg (245 lb).    Jodie Olson CMA    "

## 2019-11-21 NOTE — PROGRESS NOTES
"SUBJECTIVE:  Jayy Yo, a 59 year old male scheduled an appointment to discuss the following issues:  Fasting this am.   No black or bloody stools. No urine changes or hematuria.   No outside blood pressure reading but has cuff at home. No chest pain or shortness of breath.  No history mi and mom 91yo.   Emotionally doing ok. No nausea, vomiting or diarrhea or abdominal pain.   Medical, social, surgical, and family histories reviewed.    ROS:  All other ROS negative.  OBJECTIVE:  /75   Pulse 56   Temp 98.3  F (36.8  C) (Oral)   Resp 18   Ht 1.88 m (6' 2\")   Wt 111.1 kg (245 lb)   BMI 31.46 kg/m    EXAM:  GENERAL APPEARANCE: healthy, alert and no distress  NECK: no adenopathy, no asymmetry, masses, or scars and thyroid normal to palpation  RESP: lungs clear to auscultation - no rales, rhonchi or wheezes  CV: regular rates and rhythm, normal S1 S2, no S3 or S4 and no murmur, click or rub -  ABDOMEN:  soft, nontender, no HSM or masses and bowel sounds normal  MS: extremities normal- no gross deformities noted, no evidence of inflammation in joints, FROM in all extremities.  PSYCH: mentation appears normal and affect normal/bright    ASSESSMENT / PLAN:  (Z13.6) CARDIOVASCULAR SCREENING; LDL GOAL LESS THAN 160  (primary encounter diagnosis)    Plan: Lipid panel reflex to direct LDL Fasting        Fish oil? Continue exercise and 5-10 lbs weight loss    (Z12.5) Screening PSA (prostate specific antigen)  Plan: PROSTATE SPEC ANTIGEN SCREEN            (I10) Hypertension goal BP (blood pressure) < 140/90  Comment: stable  Plan: metoprolol succinate ER (TOPROL-XL) 50 MG 24 hr        tablet        Self-monitor and limit sodium. Reveiwed risks and side effects of medication  Chest pain or shortness of breath to er. Return to clinic if worse.     (Z12.11) Special screening for malignant neoplasms, colon  Plan: Fecal colorectal cancer screen FIT          Vinay Moralez MD         "

## 2019-11-21 NOTE — LETTER
November 22, 2019    Jayy Yo  [address] [facility] [address]            Dear Jayy,          Generally normal results except cholesterol slightly high. Start omega-3-fish oil and 5-10 lbs weight loss. Normal prostate tests,     Below is a copy of the results.  It was a pleasure to see you at your last appointment.    If you have any questions or concerns, please call myself or my nurse at [phone].    Sincerely,    Vinay Moralez MD  /gaetano    Results for orders placed or performed in visit on 11/21/19   Lipid panel reflex to direct LDL Fasting     Status: Abnormal   Result Value Ref Range    Cholesterol 203 (H) <200 mg/dL    Triglycerides 97 <150 mg/dL    HDL Cholesterol 42 >39 mg/dL    LDL Cholesterol Calculated 142 (H) <100 mg/dL    Non HDL Cholesterol 161 (H) <130 mg/dL   PROSTATE SPEC ANTIGEN SCREEN     Status: None   Result Value Ref Range    PSA 2.42 0 - 4 ug/L

## 2019-12-03 DIAGNOSIS — Z12.11 SPECIAL SCREENING FOR MALIGNANT NEOPLASMS, COLON: ICD-10-CM

## 2019-12-03 LAB — HEMOCCULT STL QL IA: NEGATIVE

## 2019-12-03 PROCEDURE — 82274 ASSAY TEST FOR BLOOD FECAL: CPT | Performed by: FAMILY MEDICINE

## 2020-09-10 NOTE — PROGRESS NOTES
"  3  SUBJECTIVE:   CC: Jayy Yo is an 60 year old male who presents for preventive health visit.     Healthy Habits:    Do you get at least three servings of calcium containing foods daily (dairy, green leafy vegetables, etc.)? { :873094::\"yes\"}    Amount of exercise or daily activities, outside of work: { :743118}    Problems taking medications regularly { :618410::\"No\"}    Medication side effects: { :797719::\"No\"}    Have you had an eye exam in the past two years? { :482019}    Do you see a dentist twice per year? { :753768}    Do you have sleep apnea, excessive snoring or daytime drowsiness?{ :165837}  {Outside tests to abstract? :859566}    {additional problems to add (Optional):520496}    Today's PHQ-2 Score:   PHQ-2 ( 1999 [employer]izer) 5/23/2019 5/23/2019   Q1: Little interest or pleasure in doing things 0 0   Q2: Feeling down, depressed or hopeless 0 0   PHQ-2 Score 0 0   Q1: Little interest or pleasure in doing things Not at all -   Q2: Feeling down, depressed or hopeless Not at all -   PHQ-2 Score 0 -     {PHQ-2 LOOK IN ASSESSMENTS (Optional) :698465}  Abuse: Current or Past(Physical, Sexual or Emotional)- {YES/NO/NA:026969}  Do you feel safe in your environment? {YES/NO/NA:827903}    Have you ever done Advance Care Planning? (For example, a Health Directive, POLST, or a discussion with a medical provider or your loved ones about your wishes): { :229611}    Social History     Tobacco Use     Smoking status: Never Smoker     Smokeless tobacco: Never Used     Tobacco comment: Lives in smoke free household   Substance Use Topics     Alcohol use: Yes     Alcohol/week: 0.0 standard drinks     Comment: social     If you drink alcohol do you typically have >3 drinks per day or >7 drinks per week? {ETOH :616979}                      Last PSA:   PSA   Date Value Ref Range Status   11/21/2019 2.42 0 - 4 ug/L Final     Comment:     Assay Method:  Chemiluminescence using Siemens Vista analyzer       Reviewed orders " "with patient. Reviewed health maintenance and updated orders accordingly - {Yes/No:462125::\"Yes\"}  {Chronicprobdata (Optional):375700}    Reviewed and updated as needed this visit by clinical staff         Reviewed and updated as needed this visit by Provider        {HISTORY OPTIONS (Optional):464793}    ROS:  { :728856::\"CONSTITUTIONAL: NEGATIVE for fever, chills, change in weight\",\"INTEGUMENTARY/SKIN: NEGATIVE for worrisome rashes, moles or lesions\",\"EYES: NEGATIVE for vision changes or irritation\",\"ENT: NEGATIVE for ear, mouth and throat problems\",\"RESP: NEGATIVE for significant cough or SOB\",\"CV: NEGATIVE for chest pain, palpitations or peripheral edema\",\"GI: NEGATIVE for nausea, abdominal pain, heartburn, or change in bowel habits\",\" male: negative for dysuria, hematuria, decreased urinary stream, erectile dysfunction, urethral discharge\",\"MUSCULOSKELETAL: NEGATIVE for significant arthralgias or myalgia\",\"NEURO: NEGATIVE for weakness, dizziness or paresthesias\",\"PSYCHIATRIC: NEGATIVE for changes in mood or affect\"}    OBJECTIVE:   There were no vitals taken for this visit.  EXAM:  {Exam Choices:053381}    {Diagnostic Test Results (Optional):718991::\"Diagnostic Test Results:\",\"Labs reviewed in Epic\"}    ASSESSMENT/PLAN:   {Diag Picklist:159065}    COUNSELING:  {MALE COUNSELING MESSAGES:281595::\"Reviewed preventive health counseling, as reflected in patient instructions\"}    Estimated body mass index is 31.46 kg/m  as calculated from the following:    Height as of 11/21/19: 1.88 m (6' 2\").    Weight as of 11/21/19: 111.1 kg (245 lb).    {Weight Management Plan (ACO) Complete if BMI is abnormal-  Ages 18-64  BMI >24.9.  Age 65+ with BMI <23 or >30 (Optional):573228}    He reports that he has never smoked. He has never used smokeless tobacco.      Counseling Resources:  ATP IV Guidelines  Pooled Cohorts Equation Calculator  FRAX Risk Assessment  ICSI Preventive Guidelines  Dietary Guidelines for Americans, " 2010  USDA's MyPlate  ASA Prophylaxis  Lung CA Screening    Vinay Moralez MD  [facility]

## 2020-09-11 ENCOUNTER — OFFICE VISIT (OUTPATIENT)
Dept: FAMILY MEDICINE | Facility: CLINIC | Age: 60
End: 2020-09-11
Payer: COMMERCIAL

## 2020-09-11 VITALS
DIASTOLIC BLOOD PRESSURE: 80 MMHG | SYSTOLIC BLOOD PRESSURE: 132 MMHG | HEART RATE: 60 BPM | WEIGHT: 243 LBS | BODY MASS INDEX: 31.18 KG/M2 | OXYGEN SATURATION: 96 % | HEIGHT: 74 IN | TEMPERATURE: 98.2 F

## 2020-09-11 DIAGNOSIS — I10 HYPERTENSION GOAL BP (BLOOD PRESSURE) < 140/90: ICD-10-CM

## 2020-09-11 DIAGNOSIS — Z00.00 ROUTINE GENERAL MEDICAL EXAMINATION AT A HEALTH CARE FACILITY: Primary | ICD-10-CM

## 2020-09-11 DIAGNOSIS — Z12.5 SCREENING PSA (PROSTATE SPECIFIC ANTIGEN): ICD-10-CM

## 2020-09-11 DIAGNOSIS — Z13.6 CARDIOVASCULAR SCREENING; LDL GOAL LESS THAN 160: ICD-10-CM

## 2020-09-11 LAB
ALBUMIN SERPL-MCNC: 3.9 G/DL (ref 3.4–5)
ALP SERPL-CCNC: 53 U/L (ref 40–150)
ALT SERPL W P-5'-P-CCNC: 34 U/L (ref 0–70)
ANION GAP SERPL CALCULATED.3IONS-SCNC: 3 MMOL/L (ref 3–14)
AST SERPL W P-5'-P-CCNC: 19 U/L (ref 0–45)
BILIRUB SERPL-MCNC: 0.3 MG/DL (ref 0.2–1.3)
BUN SERPL-MCNC: 17 MG/DL (ref 7–30)
CALCIUM SERPL-MCNC: 9.2 MG/DL (ref 8.5–10.1)
CHLORIDE SERPL-SCNC: 111 MMOL/L (ref 94–109)
CHOLEST SERPL-MCNC: 175 MG/DL
CO2 SERPL-SCNC: 29 MMOL/L (ref 20–32)
CREAT SERPL-MCNC: 1.01 MG/DL (ref 0.66–1.25)
ERYTHROCYTE [DISTWIDTH] IN BLOOD BY AUTOMATED COUNT: 13.4 % (ref 10–15)
GFR SERPL CREATININE-BSD FRML MDRD: 80 ML/MIN/{1.73_M2}
GLUCOSE SERPL-MCNC: 106 MG/DL (ref 70–99)
HCT VFR BLD AUTO: 45.5 % (ref 40–53)
HDLC SERPL-MCNC: 41 MG/DL
HGB BLD-MCNC: 15 G/DL (ref 13.3–17.7)
LDLC SERPL CALC-MCNC: 114 MG/DL
MCH RBC QN AUTO: 31.8 PG (ref 26.5–33)
MCHC RBC AUTO-ENTMCNC: 33 G/DL (ref 31.5–36.5)
MCV RBC AUTO: 96 FL (ref 78–100)
NONHDLC SERPL-MCNC: 134 MG/DL
PLATELET # BLD AUTO: 289 10E9/L (ref 150–450)
POTASSIUM SERPL-SCNC: 5.1 MMOL/L (ref 3.4–5.3)
PROT SERPL-MCNC: 7.6 G/DL (ref 6.8–8.8)
PSA SERPL-ACNC: 2.32 UG/L (ref 0–4)
RBC # BLD AUTO: 4.72 10E12/L (ref 4.4–5.9)
SODIUM SERPL-SCNC: 143 MMOL/L (ref 133–144)
TRIGL SERPL-MCNC: 99 MG/DL
WBC # BLD AUTO: 7.5 10E9/L (ref 4–11)

## 2020-09-11 PROCEDURE — G0103 PSA SCREENING: HCPCS | Performed by: FAMILY MEDICINE

## 2020-09-11 PROCEDURE — 36415 COLL VENOUS BLD VENIPUNCTURE: CPT | Performed by: FAMILY MEDICINE

## 2020-09-11 PROCEDURE — 80053 COMPREHEN METABOLIC PANEL: CPT | Performed by: FAMILY MEDICINE

## 2020-09-11 PROCEDURE — 85027 COMPLETE CBC AUTOMATED: CPT | Performed by: FAMILY MEDICINE

## 2020-09-11 PROCEDURE — 80061 LIPID PANEL: CPT | Performed by: FAMILY MEDICINE

## 2020-09-11 PROCEDURE — 99396 PREV VISIT EST AGE 40-64: CPT | Performed by: FAMILY MEDICINE

## 2020-09-11 RX ORDER — METOPROLOL SUCCINATE 50 MG/1
TABLET, EXTENDED RELEASE ORAL
Qty: 90 TABLET | Refills: 3 | Status: SHIPPED | OUTPATIENT
Start: 2020-09-11 | End: 2021-10-25

## 2020-09-11 ASSESSMENT — ENCOUNTER SYMPTOMS
PALPITATIONS: 0
DIARRHEA: 0
WEAKNESS: 0
SORE THROAT: 0
FEVER: 0
FREQUENCY: 0
CHILLS: 0
PARESTHESIAS: 0
HEMATOCHEZIA: 0
MYALGIAS: 0
ABDOMINAL PAIN: 0
HEADACHES: 0
SHORTNESS OF BREATH: 0
CONSTIPATION: 0
NAUSEA: 0
DIZZINESS: 0
HEARTBURN: 0
COUGH: 0
NERVOUS/ANXIOUS: 0
HEMATURIA: 0
DYSURIA: 0
ARTHRALGIAS: 1
JOINT SWELLING: 0
EYE PAIN: 0

## 2020-09-11 ASSESSMENT — MIFFLIN-ST. JEOR: SCORE: 1981.99

## 2020-09-11 NOTE — LETTER
September 14, 2020      Jayy Yo  [address] [address]        Dear ,    Generally normal results.    .Vinay Moralez MD/fransisca      Resulted Orders   CBC with platelets   Result Value Ref Range    WBC 7.5 4.0 - 11.0 10e9/L    RBC Count 4.72 4.4 - 5.9 10e12/L    Hemoglobin 15.0 13.3 - 17.7 g/dL      Comment:      Results confirmed by repeat test    Hematocrit 45.5 40.0 - 53.0 %    MCV 96 78 - 100 fl    MCH 31.8 26.5 - 33.0 pg    MCHC 33.0 31.5 - 36.5 g/dL    RDW 13.4 10.0 - 15.0 %    Platelet Count 289 150 - 450 10e9/L   Comprehensive metabolic panel (BMP + Alb, Alk Phos, ALT, AST, Total. Bili, TP)   Result Value Ref Range    Sodium 143 133 - 144 mmol/L    Potassium 5.1 3.4 - 5.3 mmol/L    Chloride 111 (H) 94 - 109 mmol/L    Carbon Dioxide 29 20 - 32 mmol/L    Anion Gap 3 3 - 14 mmol/L    Glucose 106 (H) 70 - 99 mg/dL      Comment:      Fasting specimen    Urea Nitrogen 17 7 - 30 mg/dL    Creatinine 1.01 0.66 - 1.25 mg/dL    GFR Estimate 80 >60 mL/min/[1.73_m2]      Comment:      Non  GFR Calc  Starting 12/18/2018, serum creatinine based estimated GFR (eGFR) will be   calculated using the Chronic Kidney Disease Epidemiology Collaboration   (CKD-EPI) equation.      GFR Estimate If Black >90 >60 mL/min/[1.73_m2]      Comment:       GFR Calc  Starting 12/18/2018, serum creatinine based estimated GFR (eGFR) will be   calculated using the Chronic Kidney Disease Epidemiology Collaboration   (CKD-EPI) equation.      Calcium 9.2 8.5 - 10.1 mg/dL    Bilirubin Total 0.3 0.2 - 1.3 mg/dL    Albumin 3.9 3.4 - 5.0 g/dL    Protein Total 7.6 6.8 - 8.8 g/dL    Alkaline Phosphatase 53 40 - 150 U/L    ALT 34 0 - 70 U/L    AST 19 0 - 45 U/L   Lipid Profile (Chol, Trig, HDL, LDL calc)   Result Value Ref Range    Cholesterol 175 <200 mg/dL    Triglycerides 99 <150 mg/dL      Comment:      Fasting specimen    HDL Cholesterol 41 >39 mg/dL    LDL Cholesterol Calculated 114 (H) <100  mg/dL      Comment:      Above desirable:  100-129 mg/dl  Borderline High:  130-159 mg/dL  High:             160-189 mg/dL  Very high:       >189 mg/dl      Non HDL Cholesterol 134 (H) <130 mg/dL      Comment:      Above Desirable:  130-159 mg/dl  Borderline high:  160-189 mg/dl  High:             190-219 mg/dl  Very high:       >219 mg/dl     PSA, screen   Result Value Ref Range    PSA 2.32 0 - 4 ug/L      Comment:      Assay Method:  Chemiluminescence using Siemens Vista analyzer

## 2020-09-11 NOTE — PROGRESS NOTES
SUBJECTIVE:   CC: Jayy Yo is an 60 year old male who presents for preventative health visit.   Follow-up htn. No black/bloody stools.  No family history colon cancer.   Gf with prostate cancer in elderly.   Mild hearing loss. Not bothersome enough at this point.   Outside blood pressure reading ok. No chest pain or shortness of breath. No urine changes or hematuria. Some weight loss.   Healthy Habits:     Getting at least 3 servings of Calcium per day:  NO    Bi-annual eye exam:  Yes    Dental care twice a year:  Yes    Sleep apnea or symptoms of sleep apnea:  Excessive snoring    Diet:  Regular (no restrictions) and Breakfast skipped    Frequency of exercise:  4-5 days/week    Duration of exercise:  30-45 minutes    Taking medications regularly:  Yes    Medication side effects:  None    PHQ-2 Total Score: 0    Additional concerns today:  No      PROBLEMS TO ADD ON...    Today's PHQ-2 Score:   PHQ-2 ( 1999 Pfizer) 9/11/2020   Q1: Little interest or pleasure in doing things 0   Q2: Feeling down, depressed or hopeless 0   PHQ-2 Score 0   Q1: Little interest or pleasure in doing things Not at all   Q2: Feeling down, depressed or hopeless Not at all   PHQ-2 Score 0       Abuse: Current or Past(Physical, Sexual or Emotional)- No  Do you feel safe in your environment? Yes    Have you ever done Advance Care Planning? (For example, a Health Directive, POLST, or a discussion with a medical provider or your loved ones about your wishes): No, advance care planning information given to patient to review.  Advanced care planning was discussed at today's visit.    Social History     Tobacco Use     Smoking status: Never Smoker     Smokeless tobacco: Never Used     Tobacco comment: Lives in smoke free household   Substance Use Topics     Alcohol use: Yes     Alcohol/week: 0.0 standard drinks     Comment: social         Alcohol Use 9/11/2020   Prescreen: >3 drinks/day or >7 drinks/week? Yes   Prescreen: >3 drinks/day or >7  "drinks/week? -   AUDIT SCORE  10       Last PSA:   PSA   Date Value Ref Range Status   11/21/2019 2.42 0 - 4 ug/L Final     Comment:     Assay Method:  Chemiluminescence using Siemens Vista analyzer       Reviewed orders with patient. Reviewed health maintenance and updated orders accordingly - Yes  Labs reviewed in EPIC    Reviewed and updated as needed this visit by clinical staff  Tobacco  Allergies  Meds  Med Hx  Surg Hx  Fam Hx  Soc Hx        Reviewed and updated as needed this visit by Provider            Review of Systems   Constitutional: Negative for chills and fever.   HENT: Positive for congestion and hearing loss. Negative for ear pain and sore throat.    Eyes: Negative for pain and visual disturbance.   Respiratory: Negative for cough and shortness of breath.    Cardiovascular: Negative for chest pain, palpitations and peripheral edema.   Gastrointestinal: Negative for abdominal pain, constipation, diarrhea, heartburn, hematochezia and nausea.   Genitourinary: Negative for discharge, dysuria, frequency, genital sores, hematuria, impotence and urgency.   Musculoskeletal: Positive for arthralgias. Negative for joint swelling and myalgias.   Skin: Negative for rash.   Neurological: Negative for dizziness, weakness, headaches and paresthesias.   Psychiatric/Behavioral: Negative for mood changes. The patient is not nervous/anxious.      All other ROS.     OBJECTIVE:   There were no vitals taken for this visit.  /80   Pulse 60   Temp 98.2  F (36.8  C) (Tympanic)   Ht 1.88 m (6' 2\")   Wt 110.2 kg (243 lb)   SpO2 96%   BMI 31.20 kg/m     Physical Exam  GENERAL: healthy, alert and no distress  EYES: Eyes grossly normal to inspection, PERRL and conjunctivae and sclerae normal  HENT: ear canals and TM's normal, nose and mouth without ulcers or lesions  NECK: no adenopathy, no asymmetry, masses, or scars and thyroid normal to palpation  RESP: lungs clear to auscultation - no rales, rhonchi or " wheezes  BREAST: normal without masses, tenderness or nipple discharge and no palpable axillary masses or adenopathy  CV: regular rate and rhythm, normal S1 S2, no S3 or S4, no murmur, click or rub, no peripheral edema and peripheral pulses strong  ABDOMEN: soft, nontender, no hepatosplenomegaly, no masses and bowel sounds normal   (male): patient deferred /rectal exams. Denies pain/masses or hernia.   MS: no gross musculoskeletal defects noted, no edema  SKIN: no suspicious lesions or rashes  NEURO: Normal strength and tone, mentation intact and speech normal  PSYCH: mentation appears normal, affect normal/bright  LYMPH: no cervical, supraclavicular, axillary adenopathy      ASSESSMENT/PLAN:   ASSESSMENT / PLAN:  (Z00.00) Routine general medical examination at a health care facility  (primary encounter diagnosis)  Comment: generally healthy and normal exam  Plan: CBC with platelets, Comprehensive metabolic         panel (BMP + Alb, Alk Phos, ALT, AST, Total.         Bili, TP)        Await labs. Reviewed self mole/testicle check handout.  VitaminD.   Patient not interested in colonoscopy but will do colo-guard - form done.   (I10) Hypertension goal BP (blood pressure) < 140/90  Comment: stable  Plan: metoprolol succinate ER (TOPROL-XL) 50 MG 24 hr        tablet        Exercise and continue monitor. Chest pain or shortness of breath to er. Return to clinic if worse.     (Z12.5) Screening PSA (prostate specific antigen)  Plan: PSA, screen            (Z13.6) CARDIOVASCULAR SCREENING; LDL GOAL LESS THAN 160  Comment: borderline in past  Plan: Lipid Profile (Chol, Trig, HDL, LDL calc)        Lower carb diet/ weight loss. Fish oil.         COUNSELING:   Reviewed preventive health counseling, as reflected in patient instructions       Regular exercise       Healthy diet/nutrition       Vision screening       Hearing screening       Colon cancer screening       Prostate cancer screening       Osteoporosis  "Prevention/Bone Health    Estimated body mass index is 31.46 kg/m  as calculated from the following:    Height as of 11/21/19: 1.88 m (6' 2\").    Weight as of 11/21/19: 111.1 kg (245 lb).         He reports that he has never smoked. He has never used smokeless tobacco.      Counseling Resources:  ATP IV Guidelines  Pooled Cohorts Equation Calculator  FRAX Risk Assessment  ICSI Preventive Guidelines  Dietary Guidelines for Americans, 2010  USDA's MyPlate  ASA Prophylaxis  Lung CA Screening    Vinay Moralez MD  [facility]  "

## 2020-10-01 ENCOUNTER — TRANSFERRED RECORDS (OUTPATIENT)
Dept: HEALTH INFORMATION MANAGEMENT | Facility: CLINIC | Age: 60
End: 2020-10-01

## 2020-10-01 LAB — COLOGUARD-ABSTRACT: NEGATIVE

## 2020-11-10 ENCOUNTER — TELEPHONE (OUTPATIENT)
Dept: FAMILY MEDICINE | Facility: CLINIC | Age: 60
End: 2020-11-10

## 2020-11-10 NOTE — TELEPHONE ENCOUNTER
Received negative Cologuard. Mailed normal letter, sent to stat scanning and placed in your basket to review.Jolene Strong MA/TARYN

## 2020-11-10 NOTE — LETTER
November 10, 2020      Jayy KENDRICK Yo  [address] [address]      Dear Jayy,    The result of your recent Cologuard testing was negative. A negative result means that Cologuard did not detect significant levels of DNA and/or hemoglobin biomarkers in the stool which are associated with colon cancer or precancer.    Thank you for completing your screening, your next screening should be completed in 3 years.      If you have any questions or concerns, please contact your care team at [phone].     Sincerely,  .Vinay Moralez MD/fransisca

## 2021-05-13 ENCOUNTER — OFFICE VISIT (OUTPATIENT)
Dept: OTOLARYNGOLOGY | Facility: CLINIC | Age: 61
End: 2021-05-13
Payer: COMMERCIAL

## 2021-05-13 VITALS
OXYGEN SATURATION: 97 % | HEART RATE: 58 BPM | DIASTOLIC BLOOD PRESSURE: 96 MMHG | RESPIRATION RATE: 16 BRPM | SYSTOLIC BLOOD PRESSURE: 160 MMHG

## 2021-05-13 DIAGNOSIS — J34.89 NASAL OBSTRUCTION: ICD-10-CM

## 2021-05-13 DIAGNOSIS — J32.2 CHRONIC ETHMOIDAL SINUSITIS: Primary | ICD-10-CM

## 2021-05-13 PROCEDURE — 31231 NASAL ENDOSCOPY DX: CPT | Performed by: OTOLARYNGOLOGY

## 2021-05-13 PROCEDURE — 99204 OFFICE O/P NEW MOD 45 MIN: CPT | Mod: 25 | Performed by: OTOLARYNGOLOGY

## 2021-05-13 RX ORDER — METHYLPREDNISOLONE 4 MG
TABLET, DOSE PACK ORAL
Qty: 21 TABLET | Refills: 0 | Status: SHIPPED | OUTPATIENT
Start: 2021-05-13 | End: 2021-11-23

## 2021-05-13 RX ORDER — FLUTICASONE PROPIONATE 50 MCG
2 SPRAY, SUSPENSION (ML) NASAL DAILY
Qty: 16 G | Refills: 3 | Status: SHIPPED | OUTPATIENT
Start: 2021-05-13
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
## 2021-10-25 DIAGNOSIS — I10 HYPERTENSION GOAL BP (BLOOD PRESSURE) < 140/90: ICD-10-CM

## 2021-10-25 RX ORDER — METOPROLOL SUCCINATE 50 MG/1
TABLET, EXTENDED RELEASE ORAL
Qty: 90 TABLET | Refills: 0 | Status: SHIPPED | OUTPATIENT
Start: 2021-10-25 | End: 2022-02-08

## 2021-10-25 NOTE — LETTER
October 25, 2021    Jayy Yo  [address] [facility] [address]    Dear Jayy,       We recently received a refill request for metoprolol succinate ER (TOPROL-XL) 50 MG 24 hr tablet.  We have refilled this for a one time 90 day supply only because you are due for a:    Blood pressure/medication check office visit and fasting lab appointment-needed in the next month per Dr Moralez.      Please schedule this lab appointment 4-5 days prior to the office visit.     Please call at your earliest convenience so that there will not be a delay with your future refills.          Thank you,   Your [facility] Team/  [phone]

## 2021-11-23 ENCOUNTER — OFFICE VISIT (OUTPATIENT)
Dept: FAMILY MEDICINE | Facility: CLINIC | Age: 61
End: 2021-11-23
Payer: COMMERCIAL

## 2021-11-23 VITALS
HEIGHT: 74 IN | WEIGHT: 233 LBS | TEMPERATURE: 96.9 F | BODY MASS INDEX: 29.9 KG/M2 | OXYGEN SATURATION: 97 % | SYSTOLIC BLOOD PRESSURE: 138 MMHG | HEART RATE: 66 BPM | DIASTOLIC BLOOD PRESSURE: 77 MMHG

## 2021-11-23 DIAGNOSIS — I10 HYPERTENSION GOAL BP (BLOOD PRESSURE) < 140/90: ICD-10-CM

## 2021-11-23 DIAGNOSIS — Z00.00 ROUTINE HISTORY AND PHYSICAL EXAMINATION OF ADULT: Primary | ICD-10-CM

## 2021-11-23 DIAGNOSIS — Z13.6 CARDIOVASCULAR SCREENING; LDL GOAL LESS THAN 130: ICD-10-CM

## 2021-11-23 DIAGNOSIS — Z12.5 SCREENING PSA (PROSTATE SPECIFIC ANTIGEN): ICD-10-CM

## 2021-11-23 DIAGNOSIS — Z12.11 COLON CANCER SCREENING: ICD-10-CM

## 2021-11-23 DIAGNOSIS — Z23 HIGH PRIORITY FOR 2019-NCOV VACCINE: ICD-10-CM

## 2021-11-23 DIAGNOSIS — J32.9 CHRONIC SINUSITIS, UNSPECIFIED LOCATION: ICD-10-CM

## 2021-11-23 LAB
ALBUMIN SERPL-MCNC: 4.1 G/DL (ref 3.4–5)
ALP SERPL-CCNC: 46 U/L (ref 40–150)
ALT SERPL W P-5'-P-CCNC: 31 U/L (ref 0–70)
ANION GAP SERPL CALCULATED.3IONS-SCNC: 6 MMOL/L (ref 3–14)
AST SERPL W P-5'-P-CCNC: 25 U/L (ref 0–45)
BILIRUB SERPL-MCNC: 0.7 MG/DL (ref 0.2–1.3)
BUN SERPL-MCNC: 14 MG/DL (ref 7–30)
CALCIUM SERPL-MCNC: 9.1 MG/DL (ref 8.5–10.1)
CHLORIDE BLD-SCNC: 108 MMOL/L (ref 94–109)
CHOLEST SERPL-MCNC: 201 MG/DL
CO2 SERPL-SCNC: 25 MMOL/L (ref 20–32)
CREAT SERPL-MCNC: 0.89 MG/DL (ref 0.66–1.25)
ERYTHROCYTE [DISTWIDTH] IN BLOOD BY AUTOMATED COUNT: 12.6 % (ref 10–15)
FASTING STATUS PATIENT QL REPORTED: YES
GFR SERPL CREATININE-BSD FRML MDRD: >90 ML/MIN/1.73M2
GLUCOSE BLD-MCNC: 107 MG/DL (ref 70–99)
HCT VFR BLD AUTO: 48.2 % (ref 40–53)
HDLC SERPL-MCNC: 41 MG/DL
HGB BLD-MCNC: 16.3 G/DL (ref 13.3–17.7)
LDLC SERPL CALC-MCNC: 138 MG/DL
MCH RBC QN AUTO: 31.8 PG (ref 26.5–33)
MCHC RBC AUTO-ENTMCNC: 33.8 G/DL (ref 31.5–36.5)
MCV RBC AUTO: 94 FL (ref 78–100)
NONHDLC SERPL-MCNC: 160 MG/DL
PLATELET # BLD AUTO: 283 10E3/UL (ref 150–450)
POTASSIUM BLD-SCNC: 4.3 MMOL/L (ref 3.4–5.3)
PROT SERPL-MCNC: 7.9 G/DL (ref 6.8–8.8)
PSA SERPL-MCNC: 2.28 UG/L (ref 0–4)
RBC # BLD AUTO: 5.13 10E6/UL (ref 4.4–5.9)
SODIUM SERPL-SCNC: 139 MMOL/L (ref 133–144)
TRIGL SERPL-MCNC: 112 MG/DL
WBC # BLD AUTO: 6.5 10E3/UL (ref 4–11)

## 2021-11-23 PROCEDURE — 36415 COLL VENOUS BLD VENIPUNCTURE: CPT | Performed by: FAMILY MEDICINE

## 2021-11-23 PROCEDURE — 0001A COVID-19,PF,PFIZER (12+ YRS): CPT | Performed by: FAMILY MEDICINE

## 2021-11-23 PROCEDURE — 99214 OFFICE O/P EST MOD 30 MIN: CPT | Mod: 25 | Performed by: FAMILY MEDICINE

## 2021-11-23 PROCEDURE — 85027 COMPLETE CBC AUTOMATED: CPT | Performed by: FAMILY MEDICINE

## 2021-11-23 PROCEDURE — 80053 COMPREHEN METABOLIC PANEL: CPT | Performed by: FAMILY MEDICINE

## 2021-11-23 PROCEDURE — 99396 PREV VISIT EST AGE 40-64: CPT | Performed by: FAMILY MEDICINE

## 2021-11-23 PROCEDURE — 80061 LIPID PANEL: CPT | Performed by: FAMILY MEDICINE

## 2021-11-23 PROCEDURE — G0103 PSA SCREENING: HCPCS | Performed by: FAMILY MEDICINE

## 2021-11-23 PROCEDURE — 91300 COVID-19,PF,PFIZER (12+ YRS): CPT | Performed by: FAMILY MEDICINE

## 2021-11-23 RX ORDER — HYDROCHLOROTHIAZIDE 12.5 MG/1
12.5 TABLET ORAL DAILY
Qty: 30 TABLET | Refills: 2 | Status: SHIPPED | OUTPATIENT
Start: 2021-11-23 | End: 2022-02-28

## 2021-11-23 ASSESSMENT — MIFFLIN-ST. JEOR: SCORE: 1927.66

## 2021-11-23 NOTE — LETTER
November 26, 2021      Jayy Yo  [address] [facility] [address]        Dear ,    We are writing to inform you of your test results.    Generally normal results except cholesterol and blood sugars a little high. Lower carbs in diet and 5-10lbs weight loss.     .Vinay Moralez MD/fransisca        Resulted Orders   CBC with platelets   Result Value Ref Range    WBC Count 6.5 4.0 - 11.0 10e3/uL    RBC Count 5.13 4.40 - 5.90 10e6/uL    Hemoglobin 16.3 13.3 - 17.7 g/dL    Hematocrit 48.2 40.0 - 53.0 %    MCV 94 78 - 100 fL    MCH 31.8 26.5 - 33.0 pg    MCHC 33.8 31.5 - 36.5 g/dL    RDW 12.6 10.0 - 15.0 %    Platelet Count 283 150 - 450 10e3/uL   Lipid Profile   Result Value Ref Range    Cholesterol 201 (H) <200 mg/dL    Triglycerides 112 <150 mg/dL    Direct Measure HDL 41 >=40 mg/dL    LDL Cholesterol Calculated 138 (H) <=100 mg/dL    Non HDL Cholesterol 160 (H) <130 mg/dL    Patient Fasting > 8hrs? Yes     Narrative    Cholesterol  Desirable:  <200 mg/dL    Triglycerides  Normal:  Less than 150 mg/dL  Borderline High:  150-199 mg/dL  High:  200-499 mg/dL  Very High:  Greater than or equal to 500 mg/dL    Direct Measure HDL  Female:  Greater than or equal to 50 mg/dL   Male:  Greater than or equal to 40 mg/dL    LDL Cholesterol  Desirable:  <100mg/dL  Above Desirable:  100-129 mg/dL   Borderline High:  130-159 mg/dL   High:  160-189 mg/dL   Very High:  >= 190 mg/dL    Non HDL Cholesterol  Desirable:  130 mg/dL  Above Desirable:  130-159 mg/dL  Borderline High:  160-189 mg/dL  High:  190-219 mg/dL  Very High:  Greater than or equal to 220 mg/dL   Prostate Specific Antigen Screen   Result Value Ref Range    Prostate Specific Antigen Screen 2.28 0.00 - 4.00 ug/L   Comprehensive metabolic panel   Result Value Ref Range    Sodium 139 133 - 144 mmol/L    Potassium 4.3 3.4 - 5.3 mmol/L    Chloride 108 94 - 109 mmol/L    Carbon Dioxide (CO2) 25 20 - 32 mmol/L    Anion Gap 6 3 - 14 mmol/L    Urea Nitrogen 14 7 - 30  mg/dL    Creatinine 0.89 0.66 - 1.25 mg/dL    Calcium 9.1 8.5 - 10.1 mg/dL    Glucose 107 (H) 70 - 99 mg/dL    Alkaline Phosphatase 46 40 - 150 U/L    AST 25 0 - 45 U/L    ALT 31 0 - 70 U/L    Protein Total 7.9 6.8 - 8.8 g/dL    Albumin 4.1 3.4 - 5.0 g/dL    Bilirubin Total 0.7 0.2 - 1.3 mg/dL    GFR Estimate >90 >60 mL/min/1.73m2      Comment:      As of July 11, 2021, eGFR is calculated by the CKD-EPI creatinine equation, without race adjustment. eGFR can be influenced by muscle mass, exercise, and diet. The reported eGFR is an estimation only and is only applicable if the renal function is stable.

## 2021-11-23 NOTE — PROGRESS NOTES
"SUBJECTIVE:  Jayy Yo, a 61 year old male scheduled an appointment to discuss the following issues:  Follow-up htn  Outside blood pressure readings ok.   No chest pain or shortness of breath. Exercise - active. No Nausea, vomiting or diarrhea or blood/black stools. Emotionally doing ok.   No rashes/mole. No major burning. Drink lot of milk.   Sleep interupted by sinus congestion. Seen ENT - nasal lavage.   No testicle pain/masses or hernias.   Eye exam due. Dentist regularly.  Medical, social, surgical, and family histories reviewed.    ROS:  All other ROS negative.  OBJECTIVE:  /77   Pulse 66   Temp 96.9  F (36.1  C) (Tympanic)   Ht 1.873 m (6' 1.75\")   Wt 105.7 kg (233 lb)   SpO2 97%   BMI 30.12 kg/m    EXAM:  GENERAL APPEARANCE: healthy, alert and no distress  EYES: EOMI,  PERRL  HENT: ear canals and TM's normal and nose clear discharge and mouth without ulcers or lesions  NECK: no adenopathy, no asymmetry, masses, or scars and thyroid normal to palpation  RESP: lungs clear to auscultation - no rales, rhonchi or wheezes  CV: regular rates and rhythm, normal S1 S2, no S3 or S4 and no murmur, click or rub -  ABDOMEN:  soft, nontender, no HSM or masses and bowel sounds normal  MS: extremities normal- no gross deformities noted, no evidence of inflammation in joints, FROM in all extremities.  SKIN: no suspicious lesions or rashes  NEURO: Normal strength and tone, sensory exam grossly normal, mentation intact and speech normal  PSYCH: mentation appears normal and affect normal/bright  LYMPHATICS: No axillary, cervical, inguinal, or supraclavicular nodes      ASSESSMENT / PLAN:  (Z00.00) Routine history and physical examination of adult  (primary encounter diagnosis)  Comment: generally healthly and normal exam  Plan: Comprehensive metabolic panel, CBC with         platelets        Await labs. Reviewed self mole/testicle check handout.  Continue lots of milk. Patient willing to do colo-guard.     (Z23) " High priority for 2019-nCoV vaccine  Plan: COVID-19,PF,PFIZER (12+ Yrs PURPLE LABEL)            (I10) Hypertension goal BP (blood pressure) < 140/90  Comment: a little high  Plan: hydrochlorothiazide (HYDRODIURIL) 12.5 MG         tablet        Add prn hydrochlorothiazide to metoprolol.. Reveiwed risks and side effects of medication. Chest pain or shortness of breath to er. Continue self-monitor. If needs daily nwill need repeat renal panel. Limit sodium.     (Z12.5) Screening PSA (prostate specific antigen)  Plan: Prostate Specific Antigen Screen            (Z13.6) CARDIOVASCULAR SCREENING; LDL GOAL LESS THAN 130  Comment: ok in past  Plan: Lipid Profile        Continue exercise.     (J32.9) Chronic sinusitis, unspecified location    Plan: can see ENT again. Continue flonase/nasal rinse prn.     (Z12.11) Colon cancer screening  Plan: COLOGJACI([employer])        Patient not interested in colonoscopy at this point.     Vinay Mroalez MD

## 2021-12-15 ENCOUNTER — ALLIED HEALTH/NURSE VISIT (OUTPATIENT)
Dept: NURSING | Facility: CLINIC | Age: 61
End: 2021-12-15
Payer: COMMERCIAL

## 2021-12-15 DIAGNOSIS — Z23 HIGH PRIORITY FOR 2019-NCOV VACCINE: Primary | ICD-10-CM

## 2021-12-15 PROCEDURE — 91300 COVID-19,PF,PFIZER (12+ YRS): CPT

## 2021-12-15 PROCEDURE — 0002A COVID-19,PF,PFIZER (12+ YRS): CPT

## 2021-12-15 PROCEDURE — 99207 PR NO CHARGE NURSE ONLY: CPT

## 2022-02-28 DIAGNOSIS — I10 HYPERTENSION GOAL BP (BLOOD PRESSURE) < 140/90: ICD-10-CM

## 2022-02-28 RX ORDER — HYDROCHLOROTHIAZIDE 12.5 MG/1
12.5 TABLET ORAL DAILY
Qty: 30 TABLET | Refills: 2 | Status: SHIPPED | OUTPATIENT
Start: 2022-02-28 | End: 2022-05-10

## 2022-05-10 ENCOUNTER — OFFICE VISIT (OUTPATIENT)
Dept: FAMILY MEDICINE | Facility: CLINIC | Age: 62
End: 2022-05-10
Payer: COMMERCIAL

## 2022-05-10 VITALS
HEIGHT: 73 IN | BODY MASS INDEX: 32.18 KG/M2 | OXYGEN SATURATION: 95 % | WEIGHT: 242.8 LBS | HEART RATE: 59 BPM | TEMPERATURE: 98.6 F | DIASTOLIC BLOOD PRESSURE: 80 MMHG | SYSTOLIC BLOOD PRESSURE: 126 MMHG

## 2022-05-10 DIAGNOSIS — I10 HYPERTENSION GOAL BP (BLOOD PRESSURE) < 140/90: ICD-10-CM

## 2022-05-10 DIAGNOSIS — Z13.6 CARDIOVASCULAR SCREENING; LDL GOAL LESS THAN 160: ICD-10-CM

## 2022-05-10 DIAGNOSIS — Z00.00 PREVENTATIVE HEALTH CARE: Primary | ICD-10-CM

## 2022-05-10 LAB
ALBUMIN SERPL-MCNC: 3.8 G/DL (ref 3.4–5)
ALP SERPL-CCNC: 37 U/L (ref 40–150)
ALT SERPL W P-5'-P-CCNC: 28 U/L (ref 0–70)
ANION GAP SERPL CALCULATED.3IONS-SCNC: 5 MMOL/L (ref 3–14)
AST SERPL W P-5'-P-CCNC: 24 U/L (ref 0–45)
BILIRUB SERPL-MCNC: 0.5 MG/DL (ref 0.2–1.3)
BUN SERPL-MCNC: 11 MG/DL (ref 7–30)
CALCIUM SERPL-MCNC: 8.9 MG/DL (ref 8.5–10.1)
CHLORIDE BLD-SCNC: 107 MMOL/L (ref 94–109)
CHOLEST SERPL-MCNC: 175 MG/DL
CO2 SERPL-SCNC: 26 MMOL/L (ref 20–32)
CREAT SERPL-MCNC: 0.81 MG/DL (ref 0.66–1.25)
ERYTHROCYTE [DISTWIDTH] IN BLOOD BY AUTOMATED COUNT: 12.6 % (ref 10–15)
FASTING STATUS PATIENT QL REPORTED: YES
GFR SERPL CREATININE-BSD FRML MDRD: >90 ML/MIN/1.73M2
GLUCOSE BLD-MCNC: 108 MG/DL (ref 70–99)
HCT VFR BLD AUTO: 44.5 % (ref 40–53)
HDLC SERPL-MCNC: 38 MG/DL
HGB BLD-MCNC: 15.4 G/DL (ref 13.3–17.7)
LDLC SERPL CALC-MCNC: 117 MG/DL
MCH RBC QN AUTO: 32.4 PG (ref 26.5–33)
MCHC RBC AUTO-ENTMCNC: 34.6 G/DL (ref 31.5–36.5)
MCV RBC AUTO: 94 FL (ref 78–100)
NONHDLC SERPL-MCNC: 137 MG/DL
PLATELET # BLD AUTO: 279 10E3/UL (ref 150–450)
POTASSIUM BLD-SCNC: 4.2 MMOL/L (ref 3.4–5.3)
PROT SERPL-MCNC: 7.3 G/DL (ref 6.8–8.8)
RBC # BLD AUTO: 4.75 10E6/UL (ref 4.4–5.9)
SODIUM SERPL-SCNC: 138 MMOL/L (ref 133–144)
TRIGL SERPL-MCNC: 101 MG/DL
WBC # BLD AUTO: 5.1 10E3/UL (ref 4–11)

## 2022-05-10 PROCEDURE — 80061 LIPID PANEL: CPT | Performed by: FAMILY MEDICINE

## 2022-05-10 PROCEDURE — 99396 PREV VISIT EST AGE 40-64: CPT | Mod: 25 | Performed by: FAMILY MEDICINE

## 2022-05-10 PROCEDURE — 90471 IMMUNIZATION ADMIN: CPT | Performed by: FAMILY MEDICINE

## 2022-05-10 PROCEDURE — 80053 COMPREHEN METABOLIC PANEL: CPT | Performed by: FAMILY MEDICINE

## 2022-05-10 PROCEDURE — 90715 TDAP VACCINE 7 YRS/> IM: CPT | Performed by: FAMILY MEDICINE

## 2022-05-10 PROCEDURE — 85027 COMPLETE CBC AUTOMATED: CPT | Performed by: FAMILY MEDICINE

## 2022-05-10 PROCEDURE — 36415 COLL VENOUS BLD VENIPUNCTURE: CPT | Performed by: FAMILY MEDICINE

## 2022-05-10 RX ORDER — METOPROLOL SUCCINATE 50 MG/1
TABLET, EXTENDED RELEASE ORAL
Qty: 90 TABLET | Refills: 3 | Status: SHIPPED | OUTPATIENT
Start: 2022-05-10 | End: 2023-05-16

## 2022-05-10 RX ORDER — HYDROCHLOROTHIAZIDE 12.5 MG/1
12.5 TABLET ORAL DAILY
Qty: 90 TABLET | Refills: 3 | Status: SHIPPED | OUTPATIENT
Start: 2022-05-10 | End: 2023-06-29

## 2022-05-10 ASSESSMENT — ENCOUNTER SYMPTOMS
FEVER: 0
DIZZINESS: 0
HEARTBURN: 0
FREQUENCY: 0
HEMATOCHEZIA: 0
EYE PAIN: 0
PARESTHESIAS: 0
NERVOUS/ANXIOUS: 0
DYSURIA: 0
SHORTNESS OF BREATH: 0
ABDOMINAL PAIN: 0
WEAKNESS: 0
HEMATURIA: 0
SORE THROAT: 0
HEADACHES: 0
PALPITATIONS: 0
ARTHRALGIAS: 0
CONSTIPATION: 0
COUGH: 0
DIARRHEA: 0
MYALGIAS: 0
CHILLS: 0
JOINT SWELLING: 0
NAUSEA: 0

## 2022-05-10 ASSESSMENT — PAIN SCALES - GENERAL: PAINLEVEL: NO PAIN (0)

## 2022-05-10 NOTE — PROGRESS NOTES
"SUBJECTIVE:  Jayy Yo, a 61 year old male scheduled an appointment to discuss the following issues:  CPE  Follow-up htn and obesity.   Outside blood pressure readings ok. Active exercise- regular.   No chest pain or shortness of breath. Exercise - active. No Nausea, vomiting or diarrhea or blood/black stools. Emotionally doing ok.   No rashes/mole. No major burning. Drink lot of milk.   Sleep interupted by sinus congestion. Seen ENT - nasal lavage - stable.  No testicle pain/masses or hernias.   Eye exam due- doing ok. Dentist regularly.   Medical, social, surgical, and family histories reviewed.    ROS:  All other ROS negative.    OBJECTIVE:  /80   Pulse 59   Temp 98.6  F (37  C) (Oral)   Ht 1.854 m (6' 1\")   Wt 110.1 kg (242 lb 12.8 oz)   SpO2 95%   BMI 32.03 kg/m    EXAM:  GENERAL APPEARANCE: healthy, alert and no distress  EYES: EOMI,  PERRL  HENT: ear canals and TM's normal and nose and mouth without ulcers or lesions  NECK: no adenopathy, no asymmetry, masses, or scars and thyroid normal to palpation  RESP: lungs clear to auscultation - no rales, rhonchi or wheezes  BREAST: normal without masses, tenderness or nipple discharge and no palpable axillary masses or adenopathy  CV: regular rates and rhythm, normal S1 S2, no S3 or S4 and no murmur, click or rub -  ABDOMEN:  soft, nontender, no HSM or masses and bowel sounds normal  GU_male: patient deferred /rectal exams  MS: extremities normal- no gross deformities noted, no evidence of inflammation in joints, FROM in all extremities.  SKIN: no suspicious lesions or rashes  NEURO: Normal strength and tone, sensory exam grossly normal, mentation intact and speech normal  PSYCH: mentation appears normal and affect normal/bright  LYMPHATICS: No axillary, cervical or supraclavicular nodes    ASSESSMENT / PLAN:  (Z00.00) Preventative health care  (primary encounter diagnosis)  Comment: generally healthy and normal exam  Plan: CBC with platelets        " Reviewed self mole/testicle check handout.      (I10) Hypertension goal BP (blood pressure) < 140/90  Comment: stable  Plan: hydrochlorothiazide (HYDRODIURIL) 12.5 MG         tablet, metoprolol succinate ER (TOPROL XL) 50         MG 24 hr tablet, Comprehensive metabolic panel        Recheck one year. Continue self-monitor. Return to clinic sooner if worse. Chest pain or shortness of breath to er.     (Z13.6) CARDIOVASCULAR SCREENING; LDL GOAL LESS THAN 160  Comment: borderline in past  Plan: Lipid Profile        Continue diet/exercise. Fish oil.     Vinay Moralez MD     Answers for HPI/ROS submitted by the patient on 5/10/2022  Frequency of exercise:: None  Getting at least 3 servings of Calcium per day:: NO  Diet:: Regular (no restrictions)  Taking medications regularly:: Yes  Medication side effects:: None  Bi-annual eye exam:: NO  Dental care twice a year:: Yes  Sleep apnea or symptoms of sleep apnea:: None  abdominal pain: No  Blood in stool: No  Blood in urine: No  chest pain: No  chills: No  congestion: No  constipation: No  cough: No  diarrhea: No  dizziness: No  ear pain: No  eye pain: No  nervous/anxious: No  fever: No  frequency: No  genital sores: No  headaches: No  hearing loss: No  heartburn: No  arthralgias: No  joint swelling: No  peripheral edema: No  mood changes: No  myalgias: No  nausea: No  dysuria: No  palpitations: No  Skin sensation changes: No  sore throat: No  urgency: No  rash: No  shortness of breath: No  visual disturbance: No  weakness: No  impotence: No  penile discharge: No  Additional concerns today:: No

## 2022-05-10 NOTE — NURSING NOTE
Prior to immunization administration, verified patients identity using patient s name and date of birth. Please see Immunization Activity for additional information.     Screening Questionnaire for Adult Immunization    Are you sick today?   No   Do you have allergies to medications, food, a vaccine component or latex?   No   Have you ever had a serious reaction after receiving a vaccination?   No   Do you have a long-term health problem with heart, lung, kidney, or metabolic disease (e.g., diabetes), asthma, a blood disorder, no spleen, complement component deficiency, a cochlear implant, or a spinal fluid leak?  Are you on long-term aspirin therapy?   No   Do you have cancer, leukemia, HIV/AIDS, or any other immune system problem?   No   Do you have a parent, brother, or sister with an immune system problem?   No   In the past 3 months, have you taken medications that affect  your immune system, such as prednisone, other steroids, or anticancer drugs; drugs for the treatment of rheumatoid arthritis, Crohn s disease, or psoriasis; or have you had radiation treatments?   No   Have you had a seizure, or a brain or other nervous system problem?   No   During the past year, have you received a transfusion of blood or blood    products, or been given immune (gamma) globulin or antiviral drug?   No   For women: Are you pregnant or is there a chance you could become       pregnant during the next month?   No   Have you received any vaccinations in the past 4 weeks?   No     Immunization questionnaire answers were all negative.        Per orders of Dr. Moralez, injection of TDAP given by Yazmin Chapa. Patient instructed to remain in clinic for 15 minutes afterwards, and to report any adverse reaction to me immediately.       Screening performed by Yazmin Chapa on 5/10/2022 at 8:32 AM.

## 2022-05-10 NOTE — LETTER
May 11, 2022      Jayy Yo  [address] [facility] [address]        Dear ,    Generally normal results except blood sugar a little high. Lower carb/higher protein diet. Normal liver, kidneys and blood count.      Resulted Orders   CBC with platelets   Result Value Ref Range    WBC Count 5.1 4.0 - 11.0 10e3/uL    RBC Count 4.75 4.40 - 5.90 10e6/uL    Hemoglobin 15.4 13.3 - 17.7 g/dL    Hematocrit 44.5 40.0 - 53.0 %    MCV 94 78 - 100 fL    MCH 32.4 26.5 - 33.0 pg    MCHC 34.6 31.5 - 36.5 g/dL    RDW 12.6 10.0 - 15.0 %    Platelet Count 279 150 - 450 10e3/uL   Comprehensive metabolic panel   Result Value Ref Range    Sodium 138 133 - 144 mmol/L    Potassium 4.2 3.4 - 5.3 mmol/L    Chloride 107 94 - 109 mmol/L    Carbon Dioxide (CO2) 26 20 - 32 mmol/L    Anion Gap 5 3 - 14 mmol/L    Urea Nitrogen 11 7 - 30 mg/dL    Creatinine 0.81 0.66 - 1.25 mg/dL    Calcium 8.9 8.5 - 10.1 mg/dL    Glucose 108 (H) 70 - 99 mg/dL    Alkaline Phosphatase 37 (L) 40 - 150 U/L    AST 24 0 - 45 U/L    ALT 28 0 - 70 U/L    Protein Total 7.3 6.8 - 8.8 g/dL    Albumin 3.8 3.4 - 5.0 g/dL    Bilirubin Total 0.5 0.2 - 1.3 mg/dL    GFR Estimate >90 >60 mL/min/1.73m2      Comment:      Effective December 21, 2021 eGFRcr in adults is calculated using the 2021 CKD-EPI creatinine equation which includes age and gender (Dimple castaneda al., NEJM, DOI: 10.1056/PTVAiz0829736)   Lipid Profile   Result Value Ref Range    Cholesterol 175 <200 mg/dL    Triglycerides 101 <150 mg/dL    Direct Measure HDL 38 (L) >=40 mg/dL    LDL Cholesterol Calculated 117 (H) <=100 mg/dL    Non HDL Cholesterol 137 (H) <130 mg/dL    Patient Fasting > 8hrs? Yes     Narrative    Cholesterol  Desirable:  <200 mg/dL    Triglycerides  Normal:  Less than 150 mg/dL  Borderline High:  150-199 mg/dL  High:  200-499 mg/dL  Very High:  Greater than or equal to 500 mg/dL    Direct Measure HDL  Female:  Greater than or equal to 50 mg/dL   Male:  Greater than or equal to 40  mg/dL    LDL Cholesterol  Desirable:  <100mg/dL  Above Desirable:  100-129 mg/dL   Borderline High:  130-159 mg/dL   High:  160-189 mg/dL   Very High:  >= 190 mg/dL    Non HDL Cholesterol  Desirable:  130 mg/dL  Above Desirable:  130-159 mg/dL  Borderline High:  160-189 mg/dL  High:  190-219 mg/dL  Very High:  Greater than or equal to 220 mg/dL       If you have any questions or concerns, please call the clinic at the number listed above.       Sincerely,      Vinay Moralez MD/fransisca

## 2023-05-16 ENCOUNTER — OFFICE VISIT (OUTPATIENT)
Dept: FAMILY MEDICINE | Facility: CLINIC | Age: 63
End: 2023-05-16
Payer: COMMERCIAL

## 2023-05-16 VITALS
DIASTOLIC BLOOD PRESSURE: 93 MMHG | TEMPERATURE: 98 F | HEIGHT: 74 IN | BODY MASS INDEX: 30.26 KG/M2 | HEART RATE: 61 BPM | SYSTOLIC BLOOD PRESSURE: 140 MMHG | WEIGHT: 235.8 LBS | OXYGEN SATURATION: 95 %

## 2023-05-16 DIAGNOSIS — Z13.6 CARDIOVASCULAR SCREENING; LDL GOAL LESS THAN 160: ICD-10-CM

## 2023-05-16 DIAGNOSIS — I10 HYPERTENSION GOAL BP (BLOOD PRESSURE) < 140/90: ICD-10-CM

## 2023-05-16 DIAGNOSIS — Z00.00 ROUTINE HISTORY AND PHYSICAL EXAMINATION OF ADULT: Primary | ICD-10-CM

## 2023-05-16 DIAGNOSIS — Z12.5 SCREENING PSA (PROSTATE SPECIFIC ANTIGEN): ICD-10-CM

## 2023-05-16 LAB
ALBUMIN SERPL-MCNC: 4.3 G/DL (ref 3.4–5)
ALP SERPL-CCNC: 45 U/L (ref 40–150)
ALT SERPL W P-5'-P-CCNC: 32 U/L (ref 0–70)
ANION GAP SERPL CALCULATED.3IONS-SCNC: 4 MMOL/L (ref 3–14)
AST SERPL W P-5'-P-CCNC: 34 U/L (ref 0–45)
BILIRUB SERPL-MCNC: 0.4 MG/DL (ref 0.2–1.3)
BUN SERPL-MCNC: 20 MG/DL (ref 7–30)
CALCIUM SERPL-MCNC: 9.2 MG/DL (ref 8.5–10.1)
CHLORIDE BLD-SCNC: 105 MMOL/L (ref 94–109)
CHOLEST SERPL-MCNC: 167 MG/DL
CO2 SERPL-SCNC: 29 MMOL/L (ref 20–32)
CREAT SERPL-MCNC: 0.93 MG/DL (ref 0.66–1.25)
ERYTHROCYTE [DISTWIDTH] IN BLOOD BY AUTOMATED COUNT: 12.6 % (ref 10–15)
FASTING STATUS PATIENT QL REPORTED: YES
GFR SERPL CREATININE-BSD FRML MDRD: >90 ML/MIN/1.73M2
GLUCOSE BLD-MCNC: 111 MG/DL (ref 70–99)
HCT VFR BLD AUTO: 46.1 % (ref 40–53)
HDLC SERPL-MCNC: 38 MG/DL
HGB BLD-MCNC: 16.1 G/DL (ref 13.3–17.7)
LDLC SERPL CALC-MCNC: 113 MG/DL
MCH RBC QN AUTO: 32.1 PG (ref 26.5–33)
MCHC RBC AUTO-ENTMCNC: 34.9 G/DL (ref 31.5–36.5)
MCV RBC AUTO: 92 FL (ref 78–100)
NONHDLC SERPL-MCNC: 129 MG/DL
PLATELET # BLD AUTO: 285 10E3/UL (ref 150–450)
POTASSIUM BLD-SCNC: 4.4 MMOL/L (ref 3.4–5.3)
PROT SERPL-MCNC: 8.2 G/DL (ref 6.8–8.8)
PSA SERPL-MCNC: 2.7 UG/L (ref 0–4)
RBC # BLD AUTO: 5.01 10E6/UL (ref 4.4–5.9)
SODIUM SERPL-SCNC: 138 MMOL/L (ref 133–144)
TRIGL SERPL-MCNC: 81 MG/DL
WBC # BLD AUTO: 6.3 10E3/UL (ref 4–11)

## 2023-05-16 PROCEDURE — 85027 COMPLETE CBC AUTOMATED: CPT | Performed by: FAMILY MEDICINE

## 2023-05-16 PROCEDURE — G0103 PSA SCREENING: HCPCS | Performed by: FAMILY MEDICINE

## 2023-05-16 PROCEDURE — 36415 COLL VENOUS BLD VENIPUNCTURE: CPT | Performed by: FAMILY MEDICINE

## 2023-05-16 PROCEDURE — 99396 PREV VISIT EST AGE 40-64: CPT | Performed by: FAMILY MEDICINE

## 2023-05-16 PROCEDURE — 80061 LIPID PANEL: CPT | Performed by: FAMILY MEDICINE

## 2023-05-16 PROCEDURE — 80053 COMPREHEN METABOLIC PANEL: CPT | Performed by: FAMILY MEDICINE

## 2023-05-16 RX ORDER — METOPROLOL SUCCINATE 50 MG/1
TABLET, EXTENDED RELEASE ORAL
Qty: 90 TABLET | Refills: 3 | Status: SHIPPED | OUTPATIENT
Start: 2023-05-16 | End: 2024-05-17

## 2023-05-16 RX ORDER — HYDROCHLOROTHIAZIDE 25 MG/1
25 TABLET ORAL DAILY
Qty: 90 TABLET | Refills: 3 | Status: SHIPPED | OUTPATIENT
Start: 2023-05-16 | End: 2024-05-17

## 2023-05-16 ASSESSMENT — ENCOUNTER SYMPTOMS
NERVOUS/ANXIOUS: 1
HEMATOCHEZIA: 0
MYALGIAS: 0
DIZZINESS: 0
PALPITATIONS: 0
SHORTNESS OF BREATH: 0
EYE PAIN: 1
CHILLS: 0
CONSTIPATION: 0
DIARRHEA: 0
DYSURIA: 0
COUGH: 0
HEADACHES: 0
HEMATURIA: 0
FREQUENCY: 0
ABDOMINAL PAIN: 0
PARESTHESIAS: 0
NAUSEA: 0
HEARTBURN: 0
SORE THROAT: 0
JOINT SWELLING: 0
FEVER: 0
WEAKNESS: 0
ARTHRALGIAS: 0

## 2023-05-16 ASSESSMENT — PAIN SCALES - GENERAL: PAINLEVEL: NO PAIN (0)

## 2023-05-16 NOTE — LETTER
May 17, 2023      Jayy Yo  [address] [facility] [address]        Dear ,    We are writing to inform you of your test results.    Generally normal results except blood sugar slightly high. Lower carbs in diet. Normal prostate,  blood count, liver and kidneys.    Resulted Orders   PROSTATE SPEC ANTIGEN SCREEN   Result Value Ref Range    Prostate Specific Antigen Screen 2.70 0.00 - 4.00 ug/L   Lipid panel reflex to direct LDL Fasting   Result Value Ref Range    Cholesterol 167 <200 mg/dL    Triglycerides 81 <150 mg/dL    Direct Measure HDL 38 (L) >=40 mg/dL    LDL Cholesterol Calculated 113 (H) <=100 mg/dL    Non HDL Cholesterol 129 <130 mg/dL    Patient Fasting > 8hrs? Yes     Narrative    Cholesterol  Desirable:  <200 mg/dL    Triglycerides  Normal:  Less than 150 mg/dL  Borderline High:  150-199 mg/dL  High:  200-499 mg/dL  Very High:  Greater than or equal to 500 mg/dL    Direct Measure HDL  Female:  Greater than or equal to 50 mg/dL   Male:  Greater than or equal to 40 mg/dL    LDL Cholesterol  Desirable:  <100mg/dL  Above Desirable:  100-129 mg/dL   Borderline High:  130-159 mg/dL   High:  160-189 mg/dL   Very High:  >= 190 mg/dL    Non HDL Cholesterol  Desirable:  130 mg/dL  Above Desirable:  130-159 mg/dL  Borderline High:  160-189 mg/dL  High:  190-219 mg/dL  Very High:  Greater than or equal to 220 mg/dL   CBC with platelets   Result Value Ref Range    WBC Count 6.3 4.0 - 11.0 10e3/uL    RBC Count 5.01 4.40 - 5.90 10e6/uL    Hemoglobin 16.1 13.3 - 17.7 g/dL    Hematocrit 46.1 40.0 - 53.0 %    MCV 92 78 - 100 fL    MCH 32.1 26.5 - 33.0 pg    MCHC 34.9 31.5 - 36.5 g/dL    RDW 12.6 10.0 - 15.0 %    Platelet Count 285 150 - 450 10e3/uL   Comprehensive metabolic panel   Result Value Ref Range    Sodium 138 133 - 144 mmol/L    Potassium 4.4 3.4 - 5.3 mmol/L    Chloride 105 94 - 109 mmol/L    Carbon Dioxide (CO2) 29 20 - 32 mmol/L    Anion Gap 4 3 - 14 mmol/L    Urea Nitrogen 20 7 - 30 mg/dL     Creatinine 0.93 0.66 - 1.25 mg/dL    Calcium 9.2 8.5 - 10.1 mg/dL    Glucose 111 (H) 70 - 99 mg/dL    Alkaline Phosphatase 45 40 - 150 U/L    AST 34 0 - 45 U/L    ALT 32 0 - 70 U/L    Protein Total 8.2 6.8 - 8.8 g/dL    Albumin 4.3 3.4 - 5.0 g/dL    Bilirubin Total 0.4 0.2 - 1.3 mg/dL    GFR Estimate >90 >60 mL/min/1.73m2      Comment:      eGFR calculated using 2021 CKD-EPI equation.       If you have any questions or concerns, please call the clinic at the number listed above.       Sincerely,      Vinay Moralez MD

## 2023-05-16 NOTE — PROGRESS NOTES
SUBJECTIVE:   CC: Jayy is an 62 year old who presents for preventative health visit.   Follow-up htn and obesity.   Active exercise- regular.   No chest pain or shortness of breath. Exercise - active. No Nausea, vomiting or diarrhea or blood/black stools. Emotionally doing ok.   No rashes/mole. No major burning. Drink lot of milk.   Sleep interupted by sinus congestion. Seen ENT - nasal lavage - stable.  No testicle pain/masses or hernias. Eye exam due. Dentist due.   Active -[place].   Outside blood pressure reading a little high.   No ALCOHOL recently.         5/16/2023     7:34 AM   Additional Questions   Roomed by JACKI Chapa CMA     Patient has been advised of split billing requirements and indicates understanding: Yes  Healthy Habits:     Getting at least 3 servings of Calcium per day:  Yes    Bi-annual eye exam:  NO    Dental care twice a year:  NO    Sleep apnea or symptoms of sleep apnea:  Daytime drowsiness    Diet:  Regular (no restrictions)    Frequency of exercise:  4-5 days/week    Duration of exercise:  30-45 minutes    Taking medications regularly:  Yes    Medication side effects:  None    PHQ-2 Total Score: 1    Additional concerns today:  No        Today's PHQ-2 Score:       5/16/2023     6:59 AM   PHQ-2 ( 1999 Pfizer)   Q1: Little interest or pleasure in doing things 0   Q2: Feeling down, depressed or hopeless 1   PHQ-2 Score 1   Q1: Little interest or pleasure in doing things Not at all   Q2: Feeling down, depressed or hopeless Several days   PHQ-2 Score 1           Social History     Tobacco Use     Smoking status: Never     Smokeless tobacco: Never     Tobacco comments:     Lives in smoke free household   Vaping Use     Vaping status: Never Used   Substance Use Topics     Alcohol use: Yes     Alcohol/week: 0.0 standard drinks of alcohol     Comment: social             5/16/2023     6:58 AM   Alcohol Use   Prescreen: >3 drinks/day or >7 drinks/week? No       Last PSA:   PSA   Date Value Ref Range  "Status   09/11/2020 2.32 0 - 4 ug/L Final     Comment:     Assay Method:  Chemiluminescence using Siemens Vista analyzer     Prostate Specific Antigen Screen   Date Value Ref Range Status   11/23/2021 2.28 0.00 - 4.00 ug/L Final       Reviewed orders with patient. Reviewed health maintenance and updated orders accordingly - Yes  Lab work is in process    Reviewed and updated as needed this visit by clinical staff   Tobacco  Allergies  Meds              Reviewed and updated as needed this visit by Provider                     Review of Systems   Constitutional: Negative for chills and fever.   HENT: Positive for congestion and hearing loss. Negative for ear pain and sore throat.    Eyes: Positive for pain and visual disturbance.   Respiratory: Negative for cough and shortness of breath.    Cardiovascular: Negative for chest pain, palpitations and peripheral edema.   Gastrointestinal: Negative for abdominal pain, constipation, diarrhea, heartburn, hematochezia and nausea.   Genitourinary: Negative for dysuria, frequency, genital sores, hematuria, impotence, penile discharge and urgency.   Musculoskeletal: Negative for arthralgias, joint swelling and myalgias.   Skin: Negative for rash.   Neurological: Negative for dizziness, weakness, headaches and paresthesias.   Psychiatric/Behavioral: Negative for mood changes. The patient is nervous/anxious.        OBJECTIVE:   BP (!) 145/92   Pulse 61   Temp 98  F (36.7  C) (Oral)   Ht 1.88 m (6' 2\")   Wt 107 kg (235 lb 12.8 oz)   SpO2 95%   BMI 30.27 kg/m      Physical Exam  GENERAL: healthy, alert and no distress  EYES: Eyes grossly normal to inspection, PERRL and conjunctivae and sclerae normal  HENT: ear canals and TM's normal, nose and mouth without ulcers or lesions  NECK: no adenopathy, no asymmetry, masses, or scars and thyroid normal to palpation  RESP: lungs clear to auscultation - no rales, rhonchi or wheezes  BREAST: normal without masses, tenderness or " nipple discharge and no palpable axillary masses or adenopathy  CV: regular rate and rhythm, normal S1 S2, no S3 or S4, no murmur, click or rub, no peripheral edema and peripheral pulses strong  ABDOMEN: soft, nontender, no hepatosplenomegaly, no masses and bowel sounds normal   (male): patient deferred /rectal exams  MS: no gross musculoskeletal defects noted, no edema  SKIN: no suspicious lesions or rashes  NEURO: Normal strength and tone, mentation intact and speech normal  PSYCH: mentation appears normal, affect normal/bright  PSYCH: mildly anxious  LYMPH: no cervical, supraclavicular, axillary adenopathy    ASSESSMENT/PLAN:   ASSESSMENT / PLAN:  (Z00.00) Routine history and physical examination of adult  (primary encounter diagnosis)  Comment: generally healthy and normal exam  Plan: CBC with platelets, Comprehensive metabolic         panel        Reviewed self mole/testicle check handout.  Follow-up eye and dentist appointment. Exercise. Call/email with questions/concerns      (Z12.5) Screening PSA (prostate specific antigen)  Plan: PROSTATE SPEC ANTIGEN SCREEN            (Z13.6) CARDIOVASCULAR SCREENING; LDL GOAL LESS THAN 160    Plan: Lipid panel reflex to direct LDL Fasting        Low carb diet.     (I10) Hypertension goal BP (blood pressure) < 140/90  Comment: a little high  Plan: metoprolol succinate ER (TOPROL XL) 50 MG 24 hr        tablet, hydrochlorothiazide (HYDRODIURIL) 25 MG        tablet        Double hydrochlorothiazide dosage. Reveiwed risks and side effects of medication  Expected course and warning signs reviewed. Call/email with questions/concerns          Patient has been advised of split billing requirements and indicates understanding: Yes      COUNSELING:   Reviewed preventive health counseling, as reflected in patient instructions       Regular exercise       Healthy diet/nutrition       Vision screening       Hearing screening       Alcohol Use        Colorectal cancer screening       " Prostate cancer screening       Osteoporosis prevention/bone health      BMI:   Estimated body mass index is 30.27 kg/m  as calculated from the following:    Height as of this encounter: 1.88 m (6' 2\").    Weight as of this encounter: 107 kg (235 lb 12.8 oz).   Weight management plan: Discussed healthy diet and exercise guidelines      He reports that he has never smoked. He has never used smokeless tobacco.            Vinay Moralez MD  [facility]  "

## 2023-06-29 DIAGNOSIS — I10 HYPERTENSION GOAL BP (BLOOD PRESSURE) < 140/90: ICD-10-CM

## 2023-06-29 RX ORDER — HYDROCHLOROTHIAZIDE 12.5 MG/1
12.5 TABLET ORAL DAILY
Qty: 90 TABLET | Refills: 1 | Status: SHIPPED | OUTPATIENT
Start: 2023-06-29 | End: 2024-05-17

## 2023-10-24 ENCOUNTER — TELEPHONE (OUTPATIENT)
Dept: FAMILY MEDICINE | Facility: CLINIC | Age: 63
End: 2023-10-24
Payer: COMMERCIAL

## 2023-10-24 NOTE — TELEPHONE ENCOUNTER
Patient Quality Outreach    Patient is due for the following:   Colon Cancer Screening      Topic Date Due    Zoster (Shingles) Vaccine (1 of 2) Never done    Flu Vaccine (1) Never done    COVID-19 Vaccine (3 - 2023-24 season) 09/01/2023       Next Steps:   Schedule a office visit for colon cancer screen    Type of outreach:    Sent letter.      Questions for provider review:    None           Yazmin Chapa

## 2023-10-24 NOTE — LETTER
October 24, 2023      Jayy Yo  [address] [facility] [address]    Your team at [facility] cares about your health. We have reviewed your chart and based on our findings; we are making the following recommendations to better manage your health.     You are in particular need of attention regarding the following:     Call or MyChart message your clinic to schedule a colonoscopy, schedule/ a FIT Test, or order a Cologuard test. If you are unsure what type of test you need, please call your clinic and speak to clinic staff.     If you have already completed these items, please contact the clinic via phone or   MyChart so your care team can review and update your records. Thank you for   choosing [facility] for your healthcare needs. For any questions,   concerns, or to schedule an appointment please contact our clinic.    Healthy Regards,      Your [facility] Care Team

## 2024-03-11 ENCOUNTER — TELEPHONE (OUTPATIENT)
Dept: FAMILY MEDICINE | Facility: CLINIC | Age: 64
End: 2024-03-11
Payer: COMMERCIAL

## 2024-03-11 NOTE — LETTER
March 11, 2024      Jayy Yo  [address] [facility] [address]        Your team at [facility] cares about your health. We have reviewed your chart and based on our findings; we are making the following recommendations to better manage your health.     You are in particular need of attention regarding the following:     Call your clinic to schedule a colonoscopy, schedule/ a FIT Test, or order a Cologuard test. If you are unsure what type of test you need, please call your clinic and speak to clinic staff.   Please schedule a Nurse Only Appointment with your primary care clinic to update your immunizations that are due.    If you have already completed these items, please contact the clinic via phone or   [employer] so your care team can review and update your records. Thank you for   choosing [facility] for your healthcare needs. For any questions,   concerns, or to schedule an appointment please contact our clinic.    Healthy Regards,      Your [facility] Care Team

## 2024-03-11 NOTE — TELEPHONE ENCOUNTER
Patient Quality Outreach    Patient is due for the following:   Colon Cancer Screening      Topic Date Due    Zoster (Shingles) Vaccine (1 of 2) Never done    Flu Vaccine (1) Never done    COVID-19 Vaccine (3 - 2023-24 season) 09/01/2023       Next Steps:   Schedule a nurse only visit for Immunizations  Colon cancer screen    Type of outreach:    Sent letter.      Questions for provider review:    None           Yazmin Chapa CMA

## 2024-05-17 ENCOUNTER — OFFICE VISIT (OUTPATIENT)
Dept: FAMILY MEDICINE | Facility: CLINIC | Age: 64
End: 2024-05-17
Payer: COMMERCIAL

## 2024-05-17 VITALS
HEART RATE: 70 BPM | OXYGEN SATURATION: 96 % | TEMPERATURE: 98.6 F | SYSTOLIC BLOOD PRESSURE: 143 MMHG | WEIGHT: 238.2 LBS | BODY MASS INDEX: 31.57 KG/M2 | HEIGHT: 73 IN | DIASTOLIC BLOOD PRESSURE: 93 MMHG

## 2024-05-17 DIAGNOSIS — Z13.6 CARDIOVASCULAR SCREENING; LDL GOAL LESS THAN 160: ICD-10-CM

## 2024-05-17 DIAGNOSIS — Z12.11 COLON CANCER SCREENING: ICD-10-CM

## 2024-05-17 DIAGNOSIS — I10 HYPERTENSION GOAL BP (BLOOD PRESSURE) < 140/90: ICD-10-CM

## 2024-05-17 DIAGNOSIS — Z12.5 SCREENING PSA (PROSTATE SPECIFIC ANTIGEN): ICD-10-CM

## 2024-05-17 DIAGNOSIS — Z00.00 ROUTINE HISTORY AND PHYSICAL EXAMINATION OF ADULT: Primary | ICD-10-CM

## 2024-05-17 LAB
ERYTHROCYTE [DISTWIDTH] IN BLOOD BY AUTOMATED COUNT: 12 % (ref 10–15)
HCT VFR BLD AUTO: 47.7 % (ref 40–53)
HGB BLD-MCNC: 16.6 G/DL (ref 13.3–17.7)
MCH RBC QN AUTO: 32.2 PG (ref 26.5–33)
MCHC RBC AUTO-ENTMCNC: 34.8 G/DL (ref 31.5–36.5)
MCV RBC AUTO: 92 FL (ref 78–100)
PLATELET # BLD AUTO: 291 10E3/UL (ref 150–450)
RBC # BLD AUTO: 5.16 10E6/UL (ref 4.4–5.9)
WBC # BLD AUTO: 6.4 10E3/UL (ref 4–11)

## 2024-05-17 PROCEDURE — G0103 PSA SCREENING: HCPCS | Performed by: FAMILY MEDICINE

## 2024-05-17 PROCEDURE — 99396 PREV VISIT EST AGE 40-64: CPT | Performed by: FAMILY MEDICINE

## 2024-05-17 PROCEDURE — 85027 COMPLETE CBC AUTOMATED: CPT | Performed by: FAMILY MEDICINE

## 2024-05-17 PROCEDURE — 36415 COLL VENOUS BLD VENIPUNCTURE: CPT | Performed by: FAMILY MEDICINE

## 2024-05-17 PROCEDURE — 99213 OFFICE O/P EST LOW 20 MIN: CPT | Mod: 25 | Performed by: FAMILY MEDICINE

## 2024-05-17 PROCEDURE — 80053 COMPREHEN METABOLIC PANEL: CPT | Performed by: FAMILY MEDICINE

## 2024-05-17 PROCEDURE — 80061 LIPID PANEL: CPT | Performed by: FAMILY MEDICINE

## 2024-05-17 RX ORDER — HYDROCHLOROTHIAZIDE 25 MG/1
25 TABLET ORAL DAILY
Qty: 90 TABLET | Refills: 3 | Status: SHIPPED | OUTPATIENT
Start: 2024-05-17

## 2024-05-17 RX ORDER — METOPROLOL SUCCINATE 50 MG/1
TABLET, EXTENDED RELEASE ORAL
Qty: 90 TABLET | Refills: 3 | Status: SHIPPED | OUTPATIENT
Start: 2024-05-17

## 2024-05-17 SDOH — HEALTH STABILITY: PHYSICAL HEALTH: ON AVERAGE, HOW MANY DAYS PER WEEK DO YOU ENGAGE IN MODERATE TO STRENUOUS EXERCISE (LIKE A BRISK WALK)?: 4 DAYS

## 2024-05-17 SDOH — HEALTH STABILITY: PHYSICAL HEALTH: ON AVERAGE, HOW MANY MINUTES DO YOU ENGAGE IN EXERCISE AT THIS LEVEL?: 60 MIN

## 2024-05-17 ASSESSMENT — SOCIAL DETERMINANTS OF HEALTH (SDOH): HOW OFTEN DO YOU GET TOGETHER WITH FRIENDS OR RELATIVES?: ONCE A WEEK

## 2024-05-17 ASSESSMENT — PAIN SCALES - GENERAL: PAINLEVEL: NO PAIN (0)

## 2024-05-17 NOTE — PROGRESS NOTES
Preventive Care Visit  [facility]  Vinay Moralez MD, Family Medicine  May 17, 2024      ASSESSMENT / PLAN:  (Z00.00) Routine history and physical examination of adult  (primary encounter diagnosis)  Comment: generally healthy and normal exam  Plan: Comprehensive metabolic panel, CBC with         platelets        Await labs. Reviewed self mole/testicle check handout.  vitmainD.     (Z12.11) Colon cancer screening  Plan: patient willing to do colonoscopy            (Z12.5) Screening PSA (prostate specific antigen)  Plan: Prostate Specific Antigen Screen            (Z13.6) CARDIOVASCULAR SCREENING; LDL GOAL LESS THAN 160  Comment: ok in past  Plan: Lipid Profile        Lower carb diet. Continue exerise    (I10) Hypertension goal BP (blood pressure) < 140/90  Comment: a little high but a little nervous  Plan: metoprolol succinate ER (TOPROL XL) 50 MG 24 hr        tablet, hydrochlorothiazide (HYDRODIURIL) 25 MG        tablet        Self-monitor. Add cozaar if worse. Chest pain or shortness of breath to er. Call/email with questions/concerns        Subjective   Jayy is a 63 year old, presenting for the following:  Physical  Follow-up htn and obesity.   Active exercise- regular.   No chest pain or shortness of breath. Exercise - active. No Nausea, vomiting or diarrhea or blood/black stools. Emotionally doing ok.   No rashes/mole. No major burning. Drink lot of milk.   Sleep interupted by sinus congestion. Seen ENT - nasal lavage - stable.   No testicle pain/masses or hernias. Eye exam due. Dentist needs.   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   Willing to do colonoscopy.   Lots of milk and MVI. Non-smoker.  No ALCOHOL  regularly.   Coffee in AM. Rare pop.       5/17/2024     9:18 AM   Additional Questions   Roomed by JACKI Chapa CMA        Health Care Directive  Patient does not have a Health Care Directive or Living Will: Patient states has Advance Directive and will bring in a copy to  clinic.    HPI              5/17/2024   General Health   How would you rate your overall physical health? Good   Feel stress (tense, anxious, or unable to sleep) Only a little   (!) STRESS CONCERN      5/17/2024   Nutrition   Three or more servings of calcium each day? Yes   Diet: Regular (no restrictions)   How many servings of fruit and vegetables per day? (!) 0-1   How many sweetened beverages each day? 0-1         5/17/2024   Exercise   Days per week of moderate/strenous exercise 4 days   Average minutes spent exercising at this level 60 min         5/17/2024   Social Factors   Frequency of gathering with friends or relatives Once a week   Worry food won't last until get money to buy more No   Food not last or not have enough money for food? No   Do you have housing?  Yes   Are you worried about losing your housing? No   Lack of transportation? No   Unable to get utilities (heat,electricity)? No         5/17/2024   Fall Risk   Fallen 2 or more times in the past year? No   Trouble with walking or balance? No          5/17/2024   Dental   Dentist two times every year? (!) NO         5/17/2024   TB Screening   Were you born outside of the [place]? No         Today's PHQ-2 Score:       5/17/2024     9:04 AM   PHQ-2 ( 1999 Pfizer)   Q1: Little interest or pleasure in doing things 0   Q2: Feeling down, depressed or hopeless 0   PHQ-2 Score 0   Q1: Little interest or pleasure in doing things Not at all   Q2: Feeling down, depressed or hopeless Not at all   PHQ-2 Score 0           5/17/2024   Substance Use   Alcohol more than 3/day or more than 7/wk Yes   How often do you have a drink containing alcohol 4 or more times a week   How many alcohol drinks on typical day 1 or 2   How often do you have 5+ drinks at one occasion Never   Audit 2/3 Score 0   How often not able to stop drinking once started Never   How often failed to do what normally expected Never   How often needed first drink in am after a heavy drinking session  "Never   How often feeling of guilt or remorse after drinking Monthly   How often unable to remember what happened the night before Never   Have you or someone else been injured because of your drinking No   Has anyone been concerned or suggested you cut down on drinking No   TOTAL SCORE - AUDIT 6   Do you use any other substances recreationally? (!) ALCOHOL    (!) CANNABIS PRODUCTS     Social History     Tobacco Use    Smoking status: Never    Smokeless tobacco: Never    Tobacco comments:     Lives in smoke free household   Vaping Use    Vaping status: Never Used   Substance Use Topics    Alcohol use: Yes     Alcohol/week: 0.0 standard drinks of alcohol     Comment: social    Drug use: No             5/17/2024   One time HIV Screening   Previous HIV test? No         5/17/2024   STI Screening   New sexual partner(s) since last STI/HIV test? No   Last PSA:   PSA   Date Value Ref Range Status   09/11/2020 2.32 0 - 4 ug/L Final     Comment:     Assay Method:  Chemiluminescence using Siemens Vista analyzer     Prostate Specific Antigen Screen   Date Value Ref Range Status   05/16/2023 2.70 0.00 - 4.00 ug/L Final     ASCVD Risk   The 10-year ASCVD risk score (Aren BARNARD, et al., 2019) is: 20.3%    Values used to calculate the score:      Age: 63 years      Sex: Male      Is Non- : No      Diabetic: No      Tobacco smoker: No      Systolic Blood Pressure: 167 mmHg      Is BP treated: Yes      HDL Cholesterol: 38 mg/dL      Total Cholesterol: 167 mg/dL           Reviewed and updated as needed this visit by Provider                             Objective    Exam  \BP (!) 143/93   Pulse 70   Temp 98.6  F (37  C) (Oral)   Ht 1.854 m (6' 1\")   Wt 108 kg (238 lb 3.2 oz)   SpO2 96%   BMI 31.43 kg/m        Physical Exam  GENERAL: alert and no distress  EYES: Eyes grossly normal to inspection, PERRL and conjunctivae and sclerae normal  HENT: ear canals and TM's normal, nose and mouth without " ulcers or lesions  NECK: no adenopathy, no asymmetry, masses, or scars  RESP: lungs clear to auscultation - no rales, rhonchi or wheezes  BREAST: normal without masses, tenderness or nipple discharge and no palpable axillary masses or adenopathy  CV: regular rate and rhythm, normal S1 S2, no S3 or S4, no murmur, click or rub, no peripheral edema   ABDOMEN: soft, nontender, no hepatosplenomegaly, no masses and bowel sounds normal   (male): patient deferred Gu/recta  MS: no gross musculoskeletal defects noted, no edema  SKIN: no suspicious lesions or rashes  NEURO: Normal strength and tone, mentation intact and speech normal  PSYCH: mentation appears normal, affect normal/bright  LYMPH: no cervical, supraclavicular, axillary, or inguinal adenopathy        Signed Electronically by: Vinay Moralez MD

## 2024-05-17 NOTE — LETTER
May 20, 2024      Jayy Yo  [address] [address]        Dear ,    We are writing to inform you of your test results.    Generally normal results except blood sugar and cholesterol a little high. Lower carbs and increase protein and weight lifting. Recheck one year. Normal liver, kidneys and prostate tests. Recheck in one year with fasting labs before md appointment.     Resulted Orders   Lipid Profile   Result Value Ref Range    Cholesterol 200 (H) <200 mg/dL    Triglycerides 137 <150 mg/dL    Direct Measure HDL 36 (L) >=40 mg/dL    LDL Cholesterol Calculated 137 (H) <=100 mg/dL    Non HDL Cholesterol 164 (H) <130 mg/dL    Patient Fasting > 8hrs? Yes     Narrative    Cholesterol  Desirable:  <200 mg/dL    Triglycerides  Normal:  Less than 150 mg/dL  Borderline High:  150-199 mg/dL  High:  200-499 mg/dL  Very High:  Greater than or equal to 500 mg/dL    Direct Measure HDL  Female:  Greater than or equal to 50 mg/dL   Male:  Greater than or equal to 40 mg/dL    LDL Cholesterol  Desirable:  <100mg/dL  Above Desirable:  100-129 mg/dL   Borderline High:  130-159 mg/dL   High:  160-189 mg/dL   Very High:  >= 190 mg/dL    Non HDL Cholesterol  Desirable:  130 mg/dL  Above Desirable:  130-159 mg/dL  Borderline High:  160-189 mg/dL  High:  190-219 mg/dL  Very High:  Greater than or equal to 220 mg/dL   Prostate Specific Antigen Screen   Result Value Ref Range    Prostate Specific Antigen Screen 2.61 0.00 - 4.50 ng/mL    Narrative    This result is obtained using the Roche Elecsys total PSA method on the selene [id number] immunoassay analyzer. Results obtained with different assay methods or kits cannot be used interchangeably.   Comprehensive metabolic panel   Result Value Ref Range    Sodium 137 135 - 145 mmol/L      Comment:      Reference intervals for this test were updated on 09/26/2023 to more accurately reflect our healthy population. There may be differences in the flagging of prior results  with similar values performed with this method. Interpretation of those prior results can be made in the context of the updated reference intervals.     Potassium 4.3 3.4 - 5.3 mmol/L    Carbon Dioxide (CO2) 24 22 - 29 mmol/L    Anion Gap 12 7 - 15 mmol/L    Urea Nitrogen 15.2 8.0 - 23.0 mg/dL    Creatinine 0.87 0.67 - 1.17 mg/dL    GFR Estimate >90 >60 mL/min/1.73m2    Calcium 9.7 8.8 - 10.2 mg/dL    Chloride 101 98 - 107 mmol/L    Glucose 108 (H) 70 - 99 mg/dL    Alkaline Phosphatase 49 40 - 150 U/L      Comment:      Reference intervals for this test were updated on 11/14/2023 to more accurately reflect our healthy population. There may be differences in the flagging of prior results with similar values performed with this method. Interpretation of those prior results can be made in the context of the updated reference intervals.    AST 29 0 - 45 U/L      Comment:      Reference intervals for this test were updated on 6/12/2023 to more accurately reflect our healthy population. There may be differences in the flagging of prior results with similar values performed with this method. Interpretation of those prior results can be made in the context of the updated reference intervals.    ALT 22 0 - 70 U/L      Comment:      Reference intervals for this test were updated on 6/12/2023 to more accurately reflect our healthy population. There may be differences in the flagging of prior results with similar values performed with this method. Interpretation of those prior results can be made in the context of the updated reference intervals.      Protein Total 7.9 6.4 - 8.3 g/dL    Albumin 4.7 3.5 - 5.2 g/dL    Bilirubin Total 0.5 <=1.2 mg/dL    Patient Fasting > 8hrs? Yes    CBC with platelets   Result Value Ref Range    WBC Count 6.4 4.0 - 11.0 10e3/uL    RBC Count 5.16 4.40 - 5.90 10e6/uL    Hemoglobin 16.6 13.3 - 17.7 g/dL    Hematocrit 47.7 40.0 - 53.0 %    MCV 92 78 - 100 fL    MCH 32.2 26.5 - 33.0 pg    MCHC 34.8  31.5 - 36.5 g/dL    RDW 12.0 10.0 - 15.0 %    Platelet Count 291 150 - 450 10e3/uL       If you have any questions or concerns, please call the clinic at the number listed above.       Sincerely,      Vinay Moralez MD

## 2024-05-18 LAB
ALBUMIN SERPL BCG-MCNC: 4.7 G/DL (ref 3.5–5.2)
ALP SERPL-CCNC: 49 U/L (ref 40–150)
ALT SERPL W P-5'-P-CCNC: 22 U/L (ref 0–70)
ANION GAP SERPL CALCULATED.3IONS-SCNC: 12 MMOL/L (ref 7–15)
AST SERPL W P-5'-P-CCNC: 29 U/L (ref 0–45)
BILIRUB SERPL-MCNC: 0.5 MG/DL
BUN SERPL-MCNC: 15.2 MG/DL (ref 8–23)
CALCIUM SERPL-MCNC: 9.7 MG/DL (ref 8.8–10.2)
CHLORIDE SERPL-SCNC: 101 MMOL/L (ref 98–107)
CHOLEST SERPL-MCNC: 200 MG/DL
CREAT SERPL-MCNC: 0.87 MG/DL (ref 0.67–1.17)
DEPRECATED HCO3 PLAS-SCNC: 24 MMOL/L (ref 22–29)
EGFRCR SERPLBLD CKD-EPI 2021: >90 ML/MIN/1.73M2
FASTING STATUS PATIENT QL REPORTED: YES
FASTING STATUS PATIENT QL REPORTED: YES
GLUCOSE SERPL-MCNC: 108 MG/DL (ref 70–99)
HDLC SERPL-MCNC: 36 MG/DL
LDLC SERPL CALC-MCNC: 137 MG/DL
NONHDLC SERPL-MCNC: 164 MG/DL
POTASSIUM SERPL-SCNC: 4.3 MMOL/L (ref 3.4–5.3)
PROT SERPL-MCNC: 7.9 G/DL (ref 6.4–8.3)
PSA SERPL DL<=0.01 NG/ML-MCNC: 2.61 NG/ML (ref 0–4.5)
SODIUM SERPL-SCNC: 137 MMOL/L (ref 135–145)
TRIGL SERPL-MCNC: 137 MG/DL

## 2024-07-02 ENCOUNTER — TELEPHONE (OUTPATIENT)
Dept: GASTROENTEROLOGY | Facility: CLINIC | Age: 64
End: 2024-07-02
Payer: COMMERCIAL

## 2024-07-02 NOTE — LETTER
July 30, 2024      Jayy MARKS Yo  [address]  [facility] [address]              Dear Jayy,  Standard Miralax Bowel Prep   Prep instructions for your colonoscopy   For prep questions, please call: [facility] [place] - [address], [place] [address] - [phone] option 4    Please read these instructions carefully at least 7 days prior to your colonoscopy procedure. Be sure to follow all directions completely. The inside of your colon must be clean to allow for a complete examination for the presence of any growths, polyps, and/or abnormalities, as well as their biopsy or removal. A number of tips are included in order to make this part of the procedure as comfortable as possible.    Getting ready   Purchase the following items over-the-counter/off the shelf at the drug store:    Four (4) - Dulcolax laxative (Bisacodyl) 5mg tablets (Do not use Dulcolax stool softener)   8.3 ounce bottle of Miralax powder (ClearLAX, SmoothLAX, PowderLAX)  64 ounces of Gatorade or similar sports drink. Not red or purple. (Pedialyte, Propel, Gatorade G2/Zero, Powerade, Powerade Zero)   10 ounce bottle of clear Magnesium Citrate    A nurse will call you to go over your appointment details and prep instructions. Not completing the nurse call could result with your appointment being cancelled.    You must arrange for an adult to drive you home after your exam. Your colonoscopy cannot be done unless you have a ride. If you need to use public transportation, someone must ride with you and stay with you for up to 24 hours.       7 days before procedure     Consult with your prescribing provider about stopping any:     Diabetic/Weight Loss Injectable Medication GLP-1 agonist (such as Exenatide (Byetta, Bydureon),  Mounjaro (Tirzepatide). Ozempic (Semaglutide). Semaglutide. Symlin (Pamlintide), Tanzeum (Albiglutide). Tirzepatide-Weight Management (Zepbound), Trulicity (Dulaglutide), Victoza  (Saxenda, Liraglutide), Wegovy (Semaglutide) please follow below guidelines for holding:    For weekly injection HOLD 7 days before procedure.  For once or twice a day injection HOLD the day before procedure and day of procedure.  For oral, daily dosing (Rybelsus) HOLD 7 days before procedure.    Blood thinning and/or anti platelet medications: such as Coumadin, Plavix, Xarelto, Eliquis, Lovenox or others, these medications may need to be stopped temporarily before your procedure.     If you take insulin for diabetes, ask your prescribing provider for instructions on how to take this medication while preparing for a colonoscopy.     NSAIDs medications such as Sulindac, Celebrex, Mobic, Relafen or others may need to be stopped before the procedure. This will be discussed during nurse review call, or you can reach out to your prescribing provider.      Stop taking iron (ferrous sulfate), multivitamins that contain iron, and/or fiber supplements (Metamucil, Benefiber, Psyllium husk powder, Fibercon, etc.).      Stop eating whole kernel corn, popcorn, nuts, and foods that contain seeds. These can stay in the colon for many days, and they can clog up the colonoscope.       3 days before procedure     Begin a low-fiber diet (see examples below). No Olestra (a fat substitute).    Consume no more than 10-15 grams of fiber each day.     It is important to stay hydrated. Drink at least eight 8-ounce glasses of water a day.      LOW FIBER DIET   You can have:   Do not have:    Starches: White bread, rolls, biscuits, croissants, Martha toast, white flour tortillas, waffles, pancakes, Japanese toast; white rice, noodles, pasta, macaroni; cooked and peeled potatoes; plain crackers, saltines; cooked farina or cream of rice; puffed rice, corn flakes, Rice Krispies, Special K      Vegetables: tender cooked and canned, vegetable broths     Fruits and fruit juices: Strained fruit juice, canned fruit without seeds or skin (not pineapple),  applesauce, pear sauce, ripe bananas, melons (not watermelon)     Milk products: Milk (plain or flavored), cheese, cottage cheese, yogurt (no berries), custard, ice cream       Proteins: Tender, well-cooked ground beef, lamb, veal, ham, pork, chicken, turkey, fish or organ meat, Tofu, eggs, creamy peanut butter      Fats and condiments:  Margarine, butter, oils, mayonnaise, sour cream, salad dressing, plain gravy; spices, cooked herbs; sugar, clear jelly, honey, syrup      Snacks, sweets and drinks: Pretzels, hard candy; plain cakes and cookies (no nuts or seeds); gelatin, plain pudding, sherbet, Popsicles; coffee, tea, carbonated ( fizzy ) drinks  Starches: Breads or rolls that contain nuts, seeds or fruit; whole wheat or whole grain breads that contain more than 2 grams of fiber per serving; cornbread; corn or whole wheat tortillas; potatoes with skin; brown rice, wild rice, quinoa, kasha (buckwheat), and oatmeal      Vegetables: Any raw or steamed vegetables; vegetables with seeds; corn in any form      Fruits and fruit juices: Prunes, prune juice, raisins and other dried fruits, berries and other fruits with seeds, canned pineapple juices with pulp such as orange, grapefruit, pineapple or tomato juice     Milk products: Any yogurt with nuts, seeds or berries      Proteins: Tough, fibrous meats with gristle; cooked dried beans, peas or lentils; crunchy peanut butter     Fats and condiments: Pickles, olives, relish, horseradish; jam, marmalade, preserves      Snacks, sweets and drinks: Popcorn, nuts, seeds, granola, coconut, candies made with nuts or seeds; all desserts that contain nuts, seeds, raisins and other dried fruits, coconut, whole grains or bran.       1 day before procedure       Start a clear liquid diet (see examples below). Do not eat any solid food.      Drink at least eight to ten 8-ounce glasses of water throughout the day. ? ? ? ? ? ? ? ?    Stop taking NSAIDs pain relievers, such as Advil,  Ibuprofen, Motrin, etc. You may take Tylenol.      CLEAR LIQUID DIET:  You can have: Do not have:    Water, tea, coffee (no milk or cream)   Soda pop, Gatorade (not red or purple)   Coconut water   Jell-O, Popsicles (no milk or fruit pieces - not red or purple)   Fat-free soup broth or bouillon   Plain hard candy, such as clear life savers (not red or purple)   Clear juices and fruit-flavored drinks, such as apple juice, white grape juice, Hi-C, and Amrit-Aid (not red or purple)  Milk or milk products such as ice cream, malts or shakes, or coffee creamer   Red or purple drinks of any kind such as cranberry juice, grape juice or Amrit-Aid. Avoid red or purple Jell-O, Popsicles, sorbet, sherbet and candy   Juices with pulp such as orange, grapefruit, pineapple or tomato juice   Cream soups of any kind   Alcohol and beer   Protein drinks or protein powder     Step 1     At 4 PM, take 2 Dulcolax (Bisacodyl) tablets.   At 5 PM, mix the entire bottle of Miralax with 64 ounces of Gatorade in a pitcher and stir to dissolve the powder. Start drinking one 8-ounce glass of the Miralax and Gatorade mixture every 15 minutes until the pitcher is HALF empty (about 4 glasses).  Drink each glass quickly. Store the rest in the refrigerator.   Continue to drink clear liquids.    Step 2     At 10 PM, take 2 Dulcolax (Bisacodyl) tablets  At 10 PM start drinking the remainder of the Miralax and Gatorade mixture. Drink one 8-ounce glass of Miralax and Gatorade mixture every 15 minutes until the pitcher is empty (about 4 glasses). Drink each glass quickly.     Step 3     If you arrive for your procedure BEFORE 11 AM:  6 hours prior to your scheduled arrival to the endoscopy unit, drink 10 ounces of clear Magnesium Citrate.    If you arrive for your procedure AFTER 11 AM:  At 6 AM on the day of the exam drink 10 ounces of clear Magnesium Citrate.       Reminders While Drinking Laxatives:     After you start drinking the solution, stay near a  toilet. You may have watery stools (diarrhea), mild cramping, bloating, and nausea. You may want to use Vaseline on the skin around your anus after each bowel movement or use wet wipes to prevent irritation. Bowel movements will be liquid and dark in color at first and then should turn clear yellow in color.      Some find it easier to drink the Miralax and Gatorade mixture when it is chilled. Do not add ice as this will dilute the laxative. Drinking from a straw can be helpful to drink the liquid faster.     If you have nausea or vomiting during drinking the solution, rinse your mouth with water and take a 15-30 minute break and then continue drinking solution.       Day of procedure     2 hours before your arrival time stop drinking all liquids, including water.   Do not smoke or swallow anything, including water or gum for at least 2 hours before your arrival time. This is a safety issue. Your procedure could be cancelled if you do not follow directions.  No chewing tobacco 6 hours prior to procedure arrival time.     You may take your necessary morning medications with sips of water (4 ounces).   Do not take diabetes medicine by mouth until after your exam.  If you have asthma, bring your inhalers.  Please perform your nebulizer treatments and airway clearance therapy in the morning prior to the procedure (if applicable).    Arrive with a responsible adult who can drive you home and stay with you for up to 24 hours. The medications used during the procedure will make you sleepy, so you won't be able to drive yourself home.   You cannot use public transportation, ride-share services, or non-medical taxi services without a responsible caregiver. Medical transport services are okay, but a caregiver must be there to receive you at your destination.  Please check in with your  when you arrive. Drivers should stay on campus.    Expect to be at the procedure center for about 1.5-2.5 hours.    Do not wear jewelry  (i.e. earrings, rings, necklaces, watches, etc.). Leave your purse, billfold, credit cards, and other valuables at home.      Bring insurance card and ID.       Answers to Commonly Asked Questions     How soon can I eat after the procedure?  You may resume your normal diet when you feel ready, unless advised otherwise by the doctor performing your procedure. We recommend starting with a light meal.   Do not drink alcohol for 24 hours after your procedure.  You may resume normal activities (work, exercise, etc.) after 24 hours.    How might I feel after the procedure?  It is normal to feel bloated and gassy after your procedure. Walking will help move the air through your colon. You can take non-aspirin pain relievers that contain acetaminophen (Tylenol).  If you are having sedation, we require a responsible adult to take you home for your safety. The sedation medicines used to relax you during the procedure can impair your judgement and reaction time, and make you forgetful and possibly a little unsteady.  Do not drive, make any important decisions, or sign any legal documents for 24 hours after your procedure.    When will I get my test results?  You should have your procedure results and any lab results (if applicable) by letter, MyChart message, or phone call within 2 weeks. If you have any questions, please call the doctor that referred you for the procedure.    How do I know if my colon is cleaned out?   After completing the bowel prep, your bowel movements should be all liquid and yellow. Your bowel movements will look similar to urine in the toilet. If there are pieces of stool (poop) in the toilet, or if you can't see to the bottom of the toilet, please call our office for advice. Call [phone] and ask to speak with a nurse.    Why is the Miralax bowel prep taken in several steps?   The stool is flushed out by a large wave of fluid going through the colon. Just sipping a large volume of the solution will  not achieve the desired result. Studies have shown that two smaller waves (or more in some cases) are better than one large one.      Why do I need to drink the magnesium citrate so close to the procedure arrival time?   The intestine continues to produce mucus and waste. Longer intervals between the prep and the exam can lead to less than desired results. However, the stomach must be empty at the time of the exam in order to allow safe sedation. Therefore, there should be nothing by mouth 2 hours before the exam is started.    What if I need to cancel or reschedule my procedure?  Contact our endoscopy scheduling team at [phone], option 2. Monday through Friday, 7:00am-5:00pm.

## 2024-07-02 NOTE — TELEPHONE ENCOUNTER
"Endoscopy Scheduling Screen    Have you had a positive Covid test in the last 14 days?  No    What is your communication preference for Instructions and/or Bowel Prep?   Mail/USPS    What insurance is in the chart?  Other:  [facility]    Ordering/Referring Provider: Vinay Moralez MD   (If ordering provider performs procedure, schedule with ordering provider unless otherwise instructed. )    BMI: Estimated body mass index is 31.43 kg/m  as calculated from the following:    Height as of 5/17/24: 1.854 m (6' 1\").    Weight as of 5/17/24: 108 kg (238 lb 3.2 oz).     Sedation Ordered  moderate sedation.   If patient BMI > 50 do not schedule in [facility].    If patient BMI > 45 do not schedule at [facility].    Are you taking methadone or Suboxone?  No    Have you had difficulties, pain, or discomfort during past endoscopy procedures?  No    Are you taking any prescription medications for pain 3 or more times per week?   NO, No RN review required.    Do you have a history of malignant hyperthermia?  No    (Females) Are you currently pregnant?   No     Have you been diagnosed or told you have pulmonary hypertension?   No    Do you have an LVAD?  No    Have you been told you have moderate to severe sleep apnea?  No    Have you been told you have COPD, asthma, or any other lung disease?  No    Do you have any heart conditions?  No     Have you ever had or are you waiting for an organ transplant?  No. Continue scheduling, no site restrictions.    Have you had a stroke or transient ischemic attack (TIA aka \"mini stroke\" in the last 6 months?   No    Have you been diagnosed with or been told you have cirrhosis of the liver?   No    Are you currently on dialysis?   No    Do you need assistance transferring?   No    BMI: Estimated body mass index is 31.43 kg/m  as calculated from the following:    Height as of 5/17/24: 1.854 m (6' 1\").    Weight as of 5/17/24: 108 kg (238 lb 3.2 oz).     Is patients BMI > 40 and scheduling location " [facility]?  No    Do you take an injectable medication for weight loss or diabetes (excluding insulin)?  No    Do you take the medication Naltrexone?  No    Do you take blood thinners?  No       Prep   Are you currently on dialysis or do you have chronic kidney disease?  No    Do you have a diagnosis of diabetes?  No    Do you have a diagnosis of cystic fibrosis (CF)?  No    On a regular basis do you go 3 -5 days between bowel movements?  No    BMI > 40?  No    Preferred Pharmacy:    [facility] [place] [address] [facility], [facility] [address]  [address], [facility] [address]  [facility] [address]  Phone: [phone] Fax: [phone]    Final Scheduling Details     Procedure scheduled  Colonoscopy    Surgeon:  Jayy Garcia     Date of procedure:  8.20.24     Pre-OP / PAC:   No - Not required for this site.    Location  [facility] - Per order.    Sedation   Moderate Sedation - Per order.      Patient Reminders:   You will receive a call from a Nurse to review instructions and health history.  This assessment must be completed prior to your procedure.  Failure to complete the Nurse assessment may result in the procedure being cancelled.      On the day of your procedure, please designate an adult(s) who can drive you home stay with you for the next 24 hours. The medicines used in the exam will make you sleepy. You will not be able to drive.      You cannot take public transportation, ride share services, or non-medical taxi service without a responsible caregiver.  Medical transport services are allowed with the requirement that a responsible caregiver will receive you at your destination.  We require that drivers and caregivers are confirmed prior to your procedure.

## 2024-08-13 ENCOUNTER — TELEPHONE (OUTPATIENT)
Dept: GASTROENTEROLOGY | Facility: CLINIC | Age: 64
End: 2024-08-13
Payer: COMMERCIAL

## 2024-08-13 NOTE — TELEPHONE ENCOUNTER
Pre assessment completed for upcoming procedure.      Procedure details:    Patient scheduled for Colonoscopy on 8.20.24.     Arrival time: 1030. Procedure time 1115    Facility location: [facility]; [address], [place] [address]. Check in location: 2nd Floor at Surgery desk.    Sedation type: Conscious sedation     Pre op exam needed? No.    Indication for procedure: screening    Designated  policy reviewed. Instructed to have someone stay 6 hours post procedure.       Chart review:     Electronic implanted devices? No    Recent diagnosis of diverticulitis within the last 6 weeks?  No      Medication review:    Diabetic? No    Anticoagulants? No    Weight loss medication/injectable? No.    NSAIDS? No    Other medication HOLDING recommendations:  N/A      Prep for procedure:     Bowel prep recommendation: Standard Miralax  Due to: standard bowel prep.    Prep instructions sent via letter     Reviewed procedure prep instructions.     Patient verbalized understanding and had no questions or concerns at this time.        Cindy Mahan RN  Endoscopy Procedure Pre Assessment   [phone] option 4

## 2024-08-20 ENCOUNTER — HOSPITAL ENCOUNTER (OUTPATIENT)
Facility: AMBULATORY SURGERY CENTER | Age: 64
Discharge: HOME | End: 2024-08-20
Attending: SURGERY | Admitting: SURGERY
Payer: COMMERCIAL

## 2024-08-20 VITALS
TEMPERATURE: 97.2 F | RESPIRATION RATE: 18 BRPM | HEART RATE: 54 BPM | SYSTOLIC BLOOD PRESSURE: 152 MMHG | DIASTOLIC BLOOD PRESSURE: 106 MMHG | OXYGEN SATURATION: 98 %

## 2024-08-20 LAB — COLONOSCOPY: NORMAL

## 2024-08-20 PROCEDURE — G8918 PT W/O PREOP ORDER IV AB PRO: HCPCS

## 2024-08-20 PROCEDURE — 45385 COLONOSCOPY W/LESION REMOVAL: CPT

## 2024-08-20 PROCEDURE — G8907 PT DOC NO EVENTS ON DISCHARG: HCPCS

## 2024-08-20 PROCEDURE — 88305 TISSUE EXAM BY PATHOLOGIST: CPT | Performed by: STUDENT IN AN ORGANIZED HEALTH CARE EDUCATION/TRAINING PROGRAM

## 2024-08-20 RX ORDER — FENTANYL CITRATE 50 UG/ML
INJECTION, SOLUTION INTRAMUSCULAR; INTRAVENOUS PRN
Status: DISCONTINUED | OUTPATIENT
Start: 2024-08-20 | End: 2024-08-20 | Stop reason: HOSPADM

## 2024-08-20 RX ORDER — NALOXONE HYDROCHLORIDE 0.4 MG/ML
0.4 INJECTION, SOLUTION INTRAMUSCULAR; INTRAVENOUS; SUBCUTANEOUS
Status: DISCONTINUED | OUTPATIENT
Start: 2024-08-20 | End: 2024-08-21 | Stop reason: HOSPADM

## 2024-08-20 RX ORDER — LIDOCAINE 40 MG/G
CREAM TOPICAL
Status: DISCONTINUED | OUTPATIENT
Start: 2024-08-20 | End: 2024-08-21 | Stop reason: HOSPADM

## 2024-08-20 RX ORDER — ONDANSETRON 2 MG/ML
4 INJECTION INTRAMUSCULAR; INTRAVENOUS
Status: DISCONTINUED | OUTPATIENT
Start: 2024-08-20 | End: 2024-08-21 | Stop reason: HOSPADM

## 2024-08-20 RX ORDER — NALOXONE HYDROCHLORIDE 0.4 MG/ML
0.2 INJECTION, SOLUTION INTRAMUSCULAR; INTRAVENOUS; SUBCUTANEOUS
Status: DISCONTINUED | OUTPATIENT
Start: 2024-08-20 | End: 2024-08-21 | Stop reason: HOSPADM

## 2024-08-20 RX ORDER — ONDANSETRON 4 MG/1
4 TABLET, ORALLY DISINTEGRATING ORAL EVERY 6 HOURS PRN
Status: DISCONTINUED | OUTPATIENT
Start: 2024-08-20 | End: 2024-08-21 | Stop reason: HOSPADM

## 2024-08-20 RX ORDER — PROCHLORPERAZINE MALEATE 10 MG
10 TABLET ORAL EVERY 6 HOURS PRN
Status: DISCONTINUED | OUTPATIENT
Start: 2024-08-20 | End: 2024-08-21 | Stop reason: HOSPADM

## 2024-08-20 RX ORDER — ONDANSETRON 2 MG/ML
4 INJECTION INTRAMUSCULAR; INTRAVENOUS EVERY 6 HOURS PRN
Status: DISCONTINUED | OUTPATIENT
Start: 2024-08-20 | End: 2024-08-21 | Stop reason: HOSPADM

## 2024-08-20 RX ORDER — FLUMAZENIL 0.1 MG/ML
0.2 INJECTION, SOLUTION INTRAVENOUS
Status: DISCONTINUED | OUTPATIENT
Start: 2024-08-20 | End: 2024-08-21 | Stop reason: HOSPADM
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
## 2024-08-22 LAB
PATH REPORT.COMMENTS IMP SPEC: NORMAL
PATH REPORT.COMMENTS IMP SPEC: NORMAL
PATH REPORT.FINAL DX SPEC: NORMAL
PATH REPORT.GROSS SPEC: NORMAL
PATH REPORT.MICROSCOPIC SPEC OTHER STN: NORMAL
PATH REPORT.RELEVANT HX SPEC: NORMAL
PHOTO IMAGE: NORMAL

## 2025-04-22 ENCOUNTER — PATIENT OUTREACH (OUTPATIENT)
Dept: FAMILY MEDICINE | Facility: CLINIC | Age: 65
End: 2025-04-22
Payer: COMMERCIAL

## 2025-04-22 NOTE — LETTER
April 22, 2025    Jayy Yo    [address] [address]    Hello,     Your care team at [facility] values your health and well-being. After reviewing your chart, we have identified recommendation(s) to help you better manage your health.    It's time for your Annual Wellness visit, on or after May 17 th, 2025. During your visit, we'll discuss your health, well-being, and any questions you may have related to preventive care. We'll also review any recommended tests, exams, or screenings you might need. To schedule please call see phone number above (only if physical letter) or use your [employer] account.    If you recently had or are having any of these services soon, please contact the clinic via phone or [employer] so that your care team can update your records.    We look forward to seeing you at your upcoming visit.    If you have any questions or concerns, please contact our clinic. Thank you for continuing to trust us with your care.    Sincerely,    Your [facility] Care Team           Electronically signed

## 2025-04-22 NOTE — TELEPHONE ENCOUNTER
Patient Quality Outreach    Patient is due for the following:   Physical Preventive Adult Physical      Topic Date Due    Pneumococcal Vaccine (1 of 1 - PCV) Never done    Zoster (Shingles) Vaccine (1 of 2) Never done    Flu Vaccine (1) Never done    COVID-19 Vaccine (3 - 2024-25 season) 09/01/2024       Action(s) Taken:   Schedule a Adult Preventative    Type of outreach:    Sent [employer] message.    Questions for provider review:    None         Tessa Saeed, NELSON  Chart routed to None.

## 2025-06-30 DIAGNOSIS — I10 HYPERTENSION GOAL BP (BLOOD PRESSURE) < 140/90: ICD-10-CM

## 2025-06-30 RX ORDER — HYDROCHLOROTHIAZIDE 25 MG/1
TABLET ORAL
Qty: 90 TABLET | Refills: 0 | Status: SHIPPED | OUTPATIENT
Start: 2025-06-30

## 2025-06-30 NOTE — TELEPHONE ENCOUNTER
Reminder letter mailed to patient with provider's message written verbatim.  Maria L BLISS    [facility]

## 2025-06-30 NOTE — TELEPHONE ENCOUNTER
Appointment with previsit fasting labs in next 2 months. Wellness exam if will be on medicare. Vinay Moralez MD

## 2025-06-30 NOTE — LETTER
June 30, 2025    Jayy Yo  [address] [facility] [id number]    Dear Jayy,     We recently received a refill request for hydrochlorothiazide (HYDRODIURIL) 25 MG tablets.  We have refilled this for a one time 90 day supply only because you are due for a:    Medication Recheck plus previsit, fasting labs within the next 2-3 months.    Please call at your earliest convenience so that there will not be a delay with your future refills.    Thank you,       Your [facility] Team/[facility]  [phone]          Electronically signed

## 2025-08-18 DIAGNOSIS — I10 HYPERTENSION GOAL BP (BLOOD PRESSURE) < 140/90: ICD-10-CM

## 2025-08-19 RX ORDER — METOPROLOL SUCCINATE 50 MG/1
TABLET, EXTENDED RELEASE ORAL
Qty: 90 TABLET | Refills: 3 | Status: SHIPPED | OUTPATIENT
Start: 2025-08-19

## (undated) DEVICE — PREP CHLORAPREP 26ML TINTED ORANGE  260815

## (undated) DEVICE — GLOVE BIOGEL PI MICRO INDICATOR UNDERGLOVE SZ 7.5 48975

## (undated) DEVICE — GLOVE BIOGEL PI ULTRATOUCH G SZ 7.5 42175

## (undated) DEVICE — SOL WATER IRRIG 1000ML BOTTLE 07139-09

## (undated) RX ORDER — FENTANYL CITRATE 50 UG/ML
INJECTION, SOLUTION INTRAMUSCULAR; INTRAVENOUS
Status: DISPENSED
Start: 2024-08-20